# Patient Record
Sex: FEMALE | Race: WHITE | Employment: OTHER | URBAN - METROPOLITAN AREA
[De-identification: names, ages, dates, MRNs, and addresses within clinical notes are randomized per-mention and may not be internally consistent; named-entity substitution may affect disease eponyms.]

---

## 2017-05-22 PROBLEM — M75.42 IMPINGEMENT SYNDROME OF LEFT SHOULDER: Status: ACTIVE | Noted: 2017-05-22

## 2017-08-09 PROBLEM — R40.4 TRANSIENT ALTERATION OF AWARENESS: Status: ACTIVE | Noted: 2017-08-09

## 2017-09-15 PROBLEM — R07.9 CHEST PAIN: Status: ACTIVE | Noted: 2017-09-15

## 2018-08-03 ENCOUNTER — APPOINTMENT (OUTPATIENT)
Dept: GENERAL RADIOLOGY | Age: 58
End: 2018-08-03
Payer: COMMERCIAL

## 2018-08-03 ENCOUNTER — HOSPITAL ENCOUNTER (EMERGENCY)
Age: 58
Discharge: HOME | End: 2018-08-03
Attending: EMERGENCY MEDICINE
Payer: COMMERCIAL

## 2018-08-03 VITALS
SYSTOLIC BLOOD PRESSURE: 144 MMHG | BODY MASS INDEX: 33.13 KG/M2 | DIASTOLIC BLOOD PRESSURE: 92 MMHG | TEMPERATURE: 98.3 F | WEIGHT: 180 LBS | HEIGHT: 62 IN | OXYGEN SATURATION: 97 % | RESPIRATION RATE: 16 BRPM | HEART RATE: 82 BPM

## 2018-08-03 DIAGNOSIS — S93.402A SPRAIN OF LEFT ANKLE, UNSPECIFIED LIGAMENT, INITIAL ENCOUNTER: Primary | ICD-10-CM

## 2018-08-03 PROCEDURE — 6370000000 HC RX 637 (ALT 250 FOR IP): Performed by: EMERGENCY MEDICINE

## 2018-08-03 PROCEDURE — 73610 X-RAY EXAM OF ANKLE: CPT

## 2018-08-03 PROCEDURE — 99283 EMERGENCY DEPT VISIT LOW MDM: CPT

## 2018-08-03 RX ORDER — HYDROCODONE BITARTRATE AND ACETAMINOPHEN 5; 325 MG/1; MG/1
1 TABLET ORAL ONCE
Status: COMPLETED | OUTPATIENT
Start: 2018-08-03 | End: 2018-08-03

## 2018-08-03 RX ADMIN — HYDROCODONE BITARTRATE AND ACETAMINOPHEN 1 TABLET: 5; 325 TABLET ORAL at 10:05

## 2018-08-03 ASSESSMENT — PAIN DESCRIPTION - ORIENTATION
ORIENTATION: LEFT
ORIENTATION: LEFT

## 2018-08-03 ASSESSMENT — PAIN DESCRIPTION - PAIN TYPE
TYPE: ACUTE PAIN
TYPE: ACUTE PAIN

## 2018-08-03 ASSESSMENT — ENCOUNTER SYMPTOMS
SORE THROAT: 0
VOMITING: 0
SHORTNESS OF BREATH: 0
COLOR CHANGE: 0
BACK PAIN: 0
NAUSEA: 0
ABDOMINAL PAIN: 0
COUGH: 0
CONSTIPATION: 0
RHINORRHEA: 0
BLOOD IN STOOL: 0
DIARRHEA: 0

## 2018-08-03 ASSESSMENT — PAIN SCALES - GENERAL
PAINLEVEL_OUTOF10: 6
PAINLEVEL_OUTOF10: 9
PAINLEVEL_OUTOF10: 9

## 2018-08-03 ASSESSMENT — PAIN DESCRIPTION - LOCATION
LOCATION: ANKLE
LOCATION: ANKLE

## 2018-08-03 ASSESSMENT — PAIN DESCRIPTION - DESCRIPTORS: DESCRIPTORS: CONSTANT

## 2018-08-03 NOTE — ED PROVIDER NOTES
Patient's 62-year-old female presenting to the emergency department with left ankle pain after an injury. She said that she was playing with her grandchild on the playground yesterday when she twisted her ankle. She said that ankle went out and everted. She felt like she heard a pop. She denies any falls or any other injuries. The history is provided by the patient. Ankle Problem   Location:  Ankle  Time since incident:  1 day  Injury: yes    Mechanism of injury comment:  Twisted ankle on step  Ankle location:  L ankle  Pain details:     Quality:  Throbbing    Radiates to:  Does not radiate    Severity:  Moderate    Onset quality:  Sudden    Duration:  1 day    Timing:  Constant    Progression:  Unchanged  Chronicity:  New  Prior injury to area:  No  Relieved by: Vicoprofen helps. Exacerbated by: walking; bearing weight; turning foot outwards; flexing foot. Ineffective treatments:  None tried  Associated symptoms: swelling    Associated symptoms: no back pain, no decreased ROM, no fever, no muscle weakness, no neck pain and no numbness        Review of Systems   Constitutional: Negative for chills and fever. HENT: Negative for congestion, rhinorrhea and sore throat. Respiratory: Negative for cough and shortness of breath. Cardiovascular: Negative for chest pain and palpitations. Gastrointestinal: Negative for abdominal pain, blood in stool, constipation, diarrhea, nausea and vomiting. Genitourinary: Negative for difficulty urinating, dysuria and hematuria. Musculoskeletal: Positive for arthralgias (left ankle) and gait problem (due to pain; has crutches at home). Negative for back pain and neck pain. Skin: Negative for color change, rash and wound. Neurological: Negative for dizziness, syncope, weakness, light-headedness and headaches. Psychiatric/Behavioral: Negative for confusion. Physical Exam   Constitutional: She is oriented to person, place, and time.  She appears well-developed and well-nourished. No distress. HENT:   Head: Normocephalic and atraumatic. Right Ear: External ear normal.   Left Ear: External ear normal.   Nose: Nose normal.   Mouth/Throat: Oropharynx is clear and moist. No oropharyngeal exudate. Eyes: Conjunctivae and EOM are normal. Pupils are equal, round, and reactive to light. Right eye exhibits no discharge. Left eye exhibits no discharge. No scleral icterus. Neck: Neck supple. Cardiovascular: Normal rate, regular rhythm, normal heart sounds and intact distal pulses. Exam reveals no gallop and no friction rub. No murmur heard. Pulmonary/Chest: Effort normal and breath sounds normal. No stridor. No respiratory distress. She has no wheezes. She has no rales. She exhibits no tenderness. Abdominal: Soft. Bowel sounds are normal. She exhibits no distension and no mass. There is no tenderness. There is no rebound and no guarding. Musculoskeletal: She exhibits edema (Some swelling noted around the lateral malleolus) and tenderness (Tenderness over distal fibula). some pain with eversion of her foot; Simpson test (achilles) intact on left   Neurological: She is alert and oriented to person, place, and time. She exhibits normal muscle tone. Skin: Skin is warm and dry. No rash noted. She is not diaphoretic. No erythema. No pallor. Psychiatric: She has a normal mood and affect. Her behavior is normal. Judgment and thought content normal.       Procedures    MDM  Number of Diagnoses or Management Options  Sprain of left ankle, unspecified ligament, initial encounter:   Diagnosis management comments: X-ray negative for fracture. Ace wrap placed on ankle. Ice was applied and she was given Norco. She said that she had analgesia at home and did not want any. Patient has crutches at home to use if she needs. RICE therpay encouraged. She should follow-up with her family doctor or return here if needed. tomorrow. --------------------------------- ADDITIONAL PROVIDER NOTES ---------------------------------  At this time the patient is without objective evidence of an acute process requiring hospitalization or inpatient management. They have remained hemodynamically stable throughout their entire ED visit and are stable for discharge with outpatient follow-up. The plan has been discussed in detail and they are aware of the specific conditions for emergent return, as well as the importance of follow-up. New Prescriptions    No medications on file       Diagnosis:  1. Sprain of left ankle, unspecified ligament, initial encounter        Disposition:  Patient's disposition: Discharge to home  Patient's condition is stable.            Ghada Dorado DO  Resident  08/03/18 0634

## 2019-11-25 ENCOUNTER — HOSPITAL ENCOUNTER (EMERGENCY)
Age: 59
Discharge: HOME | End: 2019-11-25
Payer: COMMERCIAL

## 2019-11-25 VITALS
WEIGHT: 166 LBS | OXYGEN SATURATION: 98 % | TEMPERATURE: 98 F | HEART RATE: 91 BPM | HEIGHT: 62 IN | SYSTOLIC BLOOD PRESSURE: 138 MMHG | DIASTOLIC BLOOD PRESSURE: 82 MMHG | BODY MASS INDEX: 30.55 KG/M2 | RESPIRATION RATE: 18 BRPM

## 2019-11-25 DIAGNOSIS — N30.01 ACUTE CYSTITIS WITH HEMATURIA: Primary | ICD-10-CM

## 2019-11-25 DIAGNOSIS — R03.0 ELEVATED BLOOD PRESSURE READING: ICD-10-CM

## 2019-11-25 LAB
BACTERIA: ABNORMAL /HPF
BILIRUBIN URINE: NEGATIVE
BLOOD, URINE: ABNORMAL
CLARITY: CLEAR
COLOR: YELLOW
GLUCOSE URINE: NEGATIVE MG/DL
KETONES, URINE: NEGATIVE MG/DL
LEUKOCYTE ESTERASE, URINE: ABNORMAL
NITRITE, URINE: NEGATIVE
PH UA: 5 (ref 5–9)
PROTEIN UA: 100 MG/DL
RBC UA: ABNORMAL /HPF (ref 0–2)
SPECIFIC GRAVITY UA: >=1.03 (ref 1–1.03)
UROBILINOGEN, URINE: 0.2 E.U./DL
WBC UA: ABNORMAL /HPF (ref 0–5)

## 2019-11-25 PROCEDURE — 87186 SC STD MICRODIL/AGAR DIL: CPT

## 2019-11-25 PROCEDURE — 87088 URINE BACTERIA CULTURE: CPT

## 2019-11-25 PROCEDURE — 6370000000 HC RX 637 (ALT 250 FOR IP): Performed by: NURSE PRACTITIONER

## 2019-11-25 PROCEDURE — 99283 EMERGENCY DEPT VISIT LOW MDM: CPT

## 2019-11-25 PROCEDURE — 81001 URINALYSIS AUTO W/SCOPE: CPT

## 2019-11-25 RX ORDER — CEFDINIR 300 MG/1
300 CAPSULE ORAL ONCE
Status: COMPLETED | OUTPATIENT
Start: 2019-11-25 | End: 2019-11-25

## 2019-11-25 RX ORDER — PHENAZOPYRIDINE HYDROCHLORIDE 100 MG/1
100 TABLET, FILM COATED ORAL 3 TIMES DAILY PRN
Qty: 6 TABLET | Refills: 0 | Status: SHIPPED | OUTPATIENT
Start: 2019-11-25 | End: 2019-11-27

## 2019-11-25 RX ORDER — PHENAZOPYRIDINE HYDROCHLORIDE 100 MG/1
100 TABLET, FILM COATED ORAL ONCE
Status: COMPLETED | OUTPATIENT
Start: 2019-11-25 | End: 2019-11-25

## 2019-11-25 RX ORDER — CEFDINIR 300 MG/1
300 CAPSULE ORAL 2 TIMES DAILY
Qty: 20 CAPSULE | Refills: 0 | Status: SHIPPED | OUTPATIENT
Start: 2019-11-25 | End: 2019-12-05

## 2019-11-25 RX ADMIN — CEFDINIR 300 MG: 300 CAPSULE ORAL at 18:06

## 2019-11-25 RX ADMIN — PHENAZOPYRIDINE 100 MG: 100 TABLET ORAL at 18:06

## 2019-11-28 LAB
ORGANISM: ABNORMAL
URINE CULTURE, ROUTINE: ABNORMAL

## 2021-03-21 ENCOUNTER — IMMUNIZATION (OUTPATIENT)
Dept: PRIMARY CARE CLINIC | Age: 61
End: 2021-03-21
Payer: COMMERCIAL

## 2021-03-21 PROCEDURE — 91300 COVID-19, PFIZER VACCINE 30MCG/0.3ML DOSE: CPT | Performed by: PHYSICIAN ASSISTANT

## 2021-03-21 PROCEDURE — 0001A PR IMM ADMN SARSCOV2 30MCG/0.3ML DIL RECON 1ST DOSE: CPT | Performed by: PHYSICIAN ASSISTANT

## 2021-04-15 ENCOUNTER — IMMUNIZATION (OUTPATIENT)
Dept: PRIMARY CARE CLINIC | Age: 61
End: 2021-04-15
Payer: COMMERCIAL

## 2021-04-15 PROCEDURE — 0002A COVID-19, PFIZER VACCINE 30MCG/0.3ML DOSE: CPT | Performed by: INTERNAL MEDICINE

## 2021-04-15 PROCEDURE — 91300 COVID-19, PFIZER VACCINE 30MCG/0.3ML DOSE: CPT | Performed by: INTERNAL MEDICINE

## 2021-06-21 ENCOUNTER — HOSPITAL ENCOUNTER (EMERGENCY)
Age: 61
Discharge: HOME | End: 2021-06-21
Payer: COMMERCIAL

## 2021-06-21 ENCOUNTER — APPOINTMENT (OUTPATIENT)
Dept: GENERAL RADIOLOGY | Age: 61
End: 2021-06-21
Payer: COMMERCIAL

## 2021-06-21 VITALS
RESPIRATION RATE: 16 BRPM | BODY MASS INDEX: 30.55 KG/M2 | OXYGEN SATURATION: 98 % | SYSTOLIC BLOOD PRESSURE: 134 MMHG | DIASTOLIC BLOOD PRESSURE: 78 MMHG | HEIGHT: 62 IN | TEMPERATURE: 97.9 F | HEART RATE: 97 BPM | WEIGHT: 166 LBS

## 2021-06-21 DIAGNOSIS — M79.672 LEFT FOOT PAIN: Primary | ICD-10-CM

## 2021-06-21 DIAGNOSIS — S93.602A SPRAIN OF LEFT FOOT, INITIAL ENCOUNTER: ICD-10-CM

## 2021-06-21 PROCEDURE — 99283 EMERGENCY DEPT VISIT LOW MDM: CPT

## 2021-06-21 PROCEDURE — 73630 X-RAY EXAM OF FOOT: CPT

## 2021-06-21 ASSESSMENT — PAIN DESCRIPTION - ORIENTATION: ORIENTATION: LEFT

## 2021-06-21 ASSESSMENT — PAIN SCALES - GENERAL: PAINLEVEL_OUTOF10: 10

## 2021-06-21 ASSESSMENT — PAIN DESCRIPTION - PAIN TYPE: TYPE: ACUTE PAIN

## 2021-06-21 ASSESSMENT — PAIN DESCRIPTION - ONSET: ONSET: ON-GOING

## 2021-06-21 ASSESSMENT — PAIN DESCRIPTION - DESCRIPTORS: DESCRIPTORS: ACHING

## 2021-06-21 ASSESSMENT — PAIN DESCRIPTION - LOCATION: LOCATION: FOOT

## 2021-06-21 ASSESSMENT — PAIN DESCRIPTION - PROGRESSION: CLINICAL_PROGRESSION: GRADUALLY WORSENING

## 2021-06-21 ASSESSMENT — PAIN DESCRIPTION - FREQUENCY: FREQUENCY: CONTINUOUS

## 2021-06-21 NOTE — ED PROVIDER NOTES
[facility]     Department of Emergency Medicine   ED  Provider Note  Admit Date/RoomTime: 2021  7:31 PM  ED Room:     Chief Complaint   Foot Injury (left foot injury from 2 weeks ago, still much pain)    History of Present Illness      Vijay Cordero is a 62 y.o. old female who presents to the emergency department for left foot pain that has been ongoing for the past 2 weeks. Patient states the pain worsened this morning after walking around at an [employer] show. Her  went to [facility] this weekend as well and did a lot of walking. She states she initially fell down the steps a couple weeks ago which caused her initial injury. She states she was able to walk fine afterwards. The pain just began after walking around a lot this weekend. She is denying any numbness/tingling or sensation changes. She has no calf tenderness or swelling. She denies any pain to her knee, hip, or back. She is alert oriented x3 and in no apparent distress at this exam.  Patient is nontoxic-appearing. She is politely declining pain medication. ROS   Pertinent positives and negatives are stated within HPI, all other systems reviewed and are negative. Past Medical History:   Past Medical History:   Diagnosis Date    Headache(784.0)       Past Surgical History:  has a past surgical history that includes Hysterectomy; Mandible surgery; Tonsillectomy;  section; and Colonoscopy. Social History:  reports that she has never smoked. She has never used smokeless tobacco. She reports current alcohol use. She reports that she does not use drugs. Family History: family history includes Cancer in her father; No Known Problems in her brother, brother, brother, and sister; Other in her mother. Allergies: Patient has no known allergies.     Physical Exam     Vitals:    21 1915 21 1935   BP: 134/78    Pulse: 97    Resp: 16    Temp: 97.9 °F (36.6 °C)    TempSrc: Infrared    SpO2: 98%    Weight:  166 lb (75.3 kg)   Height:  5' 2\" (1.575 m)   Oxygen Saturation Interpretation: Normal.    Constitutional:  Alert, development consistent with age. NAD  HEENT:  NC/NT. Airway patent. Neck:  Normal ROM. Supple. Non-tender  Back: No lumbar tenderness. Lower Extremity:  Left: foot. Tenderness: Mild to talus             Swelling: None on this exam.              Deformity: No.             ROM: diminished range with pain. Skin:  no erythema, rash or wounds noted, no bruising, compartments soft and compressible       Distal Function:              Motor deficit: none. Sensory deficit: none. Intact distally            Pulse deficit: none. Strong pedal            Capillary refill: normal. <3 seconds x5 toes   Integument:  Normal turgor. Warm, dry, without visible rash, unless noted elsewhere. Neurological: Motor functions intact. Lab / Imaging Results   (All laboratory and radiology results have been personally reviewed by myself)  Labs:  No results found for this visit on 06/21/21. Imaging: All Radiology results interpreted by Radiologist unless otherwise noted. XR FOOT LEFT (MIN 3 VIEWS)   Final Result   No acute osseous findings about the left foot. Normal alignment. Mild left   large toe osteoarthritis. ED Course / Medical Decision Making   Medications - No data to display     Consult(s):  None    Procedure(s):  none    MDM:      Films were obtained based on low suspicion for bony injury as per history/physical findings . Plan is subsequently for symptom control, limited use and  immobilization with appropriate outpatient follow-up. Counseling: The emergency provider has spoken with the patient or caregiver and discussed todays results, in addition to providing specific details for the plan of care and counseling regarding the diagnosis and prognosis. Questions are answered at this time and they are agreeable with the plan.    Patient understands they must follow-up with podiatry. Patient states she has an appointment with her family doctor next week. RICE discussed. They were educated on signs and symptoms that would require emergent return. Patient was educated on newly prescribed medications. They were also instructed on ace wrap use and care. Assessment      1. Left foot pain    2. Sprain of left foot, initial encounter      Plan   Discharge to home  Patient condition is good    New Medications     New Prescriptions    No medications on file     Electronically signed by Dannielle Mack PA-C   DD: 6/21/21    **This report was transcribed using voice recognition software. Every effort was made to ensure accuracy; however, inadvertent computerized transcription errors may be present.     END OF ED PROVIDER NOTE        Dannielle Mack PA-C  06/21/21 2030

## 2021-06-22 NOTE — ED NOTES
Ace wrap intact with good circulation noted, capillary refill < 3 seconds.       Sandy Price RN  06/21/21 2040

## 2021-06-22 NOTE — DISCHARGE INSTR - COC
Continuity of Care Form    Patient Name: Oscar Ricks   :  1960  MRN:  [id number]    Admit date:  2021  Discharge date:  ***    Code Status Order: Prior   Advance Directives:     Admitting Physician:  No admitting provider for patient encounter. PCP: Pietro Willett MD    Discharging Nurse: [facility] Unit/Room#:   Discharging Unit Phone Number: ***    Emergency Contact:   Extended Emergency Contact Information  [facility] [address] [facility] [address] [facility] Phone: [phone]  Relation: Other   needed? No  Secondary Emergency Contact: Leander Silver  Address: [address] Phone: [phone]  Mobile Phone: [phone]  Relation: Spouse   needed?  No    Past Surgical History:  Past Surgical History:   Procedure Laterality Date     SECTION      COLONOSCOPY      HYSTERECTOMY      MANDIBLE SURGERY      TONSILLECTOMY         Immunization History:   Immunization History   Administered Date(s) Administered    COVID-19, Sheriff Peter, PF, 30mcg/0.3mL 2021, 04/15/2021       Active Problems:  Patient Active Problem List   Diagnosis Code    Impingement syndrome of left shoulder M75.42    Transient cerebral ischemia G45.9    Vasovagal syncope R55    Transient alteration of awareness R40.4    Chest pain R07.9       Isolation/Infection:   Isolation          No Isolation        Patient Infection Status     None to display          Nurse Assessment:  Last Vital Signs: /78   Pulse 97   Temp 97.9 °F (36.6 °C) (Infrared)   Resp 16   Ht 5' 2\" (1.575 m)   Wt 166 lb (75.3 kg)   SpO2 98%   BMI 30.36 kg/m²     Last documented pain score (0-10 scale): Pain Level: 10  Last Weight:   Wt Readings from Last 1 Encounters:   21 166 lb (75.3 kg)     Mental Status:  {IP PT MENTAL STATUS:}    IV Access:  [address] [facility] IV ACCESS:[id number]}    Nursing · Address:  · Phone:  · Fax:    Dialysis Facility (if applicable)   · Name:  · Address:  · Dialysis Schedule:  · Phone:  · Fax:    / signature: {Esignature:390030692}    PHYSICIAN SECTION    Prognosis: {Prognosis:0369648470}    Condition at Discharge: Katy Oliva Patient Condition:[id number]}    Rehab Potential (if transferring to Rehab): {Prognosis:8503313023}    Recommended Labs or Other Treatments After Discharge: ***    Physician Certification: I certify the above information and transfer of Kiersten Atkinson  is necessary for the continuing treatment of the diagnosis listed and that she requires {Admit to Appropriate Level of Care:10910} for {GREATER/LESS:824369011} 30 days.      Update Admission H&P: {CHP DME Changes in DISA}    PHYSICIAN SIGNATURE:  {Esignature:[id number]}

## 2021-12-09 ENCOUNTER — HOSPITAL ENCOUNTER (EMERGENCY)
Age: 61
Discharge: HOME | End: 2021-12-09
Payer: COMMERCIAL

## 2021-12-09 ENCOUNTER — APPOINTMENT (OUTPATIENT)
Dept: GENERAL RADIOLOGY | Age: 61
End: 2021-12-09
Payer: COMMERCIAL

## 2021-12-09 VITALS
TEMPERATURE: 97.4 F | BODY MASS INDEX: 34.75 KG/M2 | SYSTOLIC BLOOD PRESSURE: 114 MMHG | OXYGEN SATURATION: 99 % | DIASTOLIC BLOOD PRESSURE: 68 MMHG | WEIGHT: 190 LBS | HEART RATE: 78 BPM | RESPIRATION RATE: 16 BRPM

## 2021-12-09 DIAGNOSIS — S92.515A CLOSED NONDISPLACED FRACTURE OF PROXIMAL PHALANX OF LESSER TOE OF LEFT FOOT, INITIAL ENCOUNTER: Primary | ICD-10-CM

## 2021-12-09 PROCEDURE — 99283 EMERGENCY DEPT VISIT LOW MDM: CPT

## 2021-12-09 PROCEDURE — 73630 X-RAY EXAM OF FOOT: CPT

## 2021-12-09 PROCEDURE — 6370000000 HC RX 637 (ALT 250 FOR IP): Performed by: PHYSICIAN ASSISTANT

## 2021-12-09 RX ORDER — OXYCODONE HYDROCHLORIDE AND ACETAMINOPHEN 5; 325 MG/1; MG/1
1 TABLET ORAL EVERY 6 HOURS PRN
Qty: 20 TABLET | Refills: 0 | Status: SHIPPED | OUTPATIENT
Start: 2021-12-09 | End: 2021-12-14

## 2021-12-09 RX ORDER — OXYCODONE HYDROCHLORIDE AND ACETAMINOPHEN 5; 325 MG/1; MG/1
1 TABLET ORAL ONCE
Status: COMPLETED | OUTPATIENT
Start: 2021-12-09 | End: 2021-12-09

## 2021-12-09 RX ADMIN — OXYCODONE AND ACETAMINOPHEN 1 TABLET: 5; 325 TABLET ORAL at 14:31

## 2021-12-09 ASSESSMENT — PAIN SCALES - GENERAL
PAINLEVEL_OUTOF10: 10
PAINLEVEL_OUTOF10: 10
PAINLEVEL_OUTOF10: 5

## 2021-12-09 ASSESSMENT — PAIN - FUNCTIONAL ASSESSMENT: PAIN_FUNCTIONAL_ASSESSMENT: 0-10

## 2021-12-09 ASSESSMENT — PAIN DESCRIPTION - ONSET: ONSET: ON-GOING

## 2021-12-09 ASSESSMENT — PAIN DESCRIPTION - PROGRESSION: CLINICAL_PROGRESSION: GRADUALLY IMPROVING

## 2021-12-09 ASSESSMENT — PAIN DESCRIPTION - ORIENTATION: ORIENTATION: LEFT

## 2021-12-09 ASSESSMENT — PAIN DESCRIPTION - PAIN TYPE: TYPE: ACUTE PAIN

## 2021-12-09 ASSESSMENT — PAIN DESCRIPTION - LOCATION: LOCATION: FOOT

## 2021-12-09 ASSESSMENT — PAIN DESCRIPTION - DESCRIPTORS: DESCRIPTORS: ACHING

## 2021-12-09 ASSESSMENT — PAIN DESCRIPTION - FREQUENCY: FREQUENCY: CONTINUOUS

## 2021-12-09 NOTE — ED PROVIDER NOTES
[facility]:      HPI:  21, Time: 2:19 PM AUDRA Medina is a 62 y.o. female presenting to the ED for left 4th toe injury, beginning prior to coming to ER. The patient reports that she was walking at home and she accidentally kicked the end table and she heard a large snap to her foot. She has had severe pain to her left fourth toe since that time. She has been having difficulty bearing weight on her left foot when she applies pressure. She has had no previous injuries to her left foot. She did not fall to the ground but sustained immediate pain to her left foot fourth digit. The complaint has been constant, severe in severity, and worsened when pressure applied. Review of Systems:   A complete review of systems was performed and pertinent positives and negatives are stated within HPI, all other systems reviewed and are negative.      --------------------------------------------- PAST HISTORY ---------------------------------------------  Past Medical History:  has a past medical history of Headache(784.0). Past Surgical History:  has a past surgical history that includes Hysterectomy; Mandible surgery; Tonsillectomy;  section; and Colonoscopy. Social History:  reports that she has never smoked. She has never used smokeless tobacco. She reports current alcohol use. She reports that she does not use drugs. Family History: family history includes Cancer in her father; No Known Problems in her brother, brother, brother, and sister; Other in her mother. The patients home medications have been reviewed. Allergies: Patient has no known allergies. -------------------------------------------------- RESULTS -------------------------------------------------  All laboratory and radiology results have been personally reviewed by myself   LABS:  No results found for this visit on 21.     RADIOLOGY:  Interpreted by Radiologist.  XR FOOT LEFT (MIN 3 VIEWS)   Final Result   4th proximal phalangeal fracture.             ------------------------- NURSING NOTES AND VITALS REVIEWED ---------------------------   The nursing notes within the ED encounter and vital signs as below have been reviewed. /68 Comment: manual left arm  Pulse 78   Temp 97.4 °F (36.3 °C) (Oral)   Resp 16   Wt 190 lb (86.2 kg)   SpO2 99%   BMI 34.75 kg/m²   Oxygen Saturation Interpretation: Normal      ---------------------------------------------------PHYSICAL EXAM--------------------------------------      Constitutional/General: Alert and oriented x3, well appearing, non toxic in NAD  Head: Normocephalic and atraumatic  Eyes: PERRL, EOMI  Mouth: Oropharynx clear  Neck: Supple, full ROM  Pulmonary: Lungs clear to auscultation . Not in respiratory distress  Cardiovascular:  Regular rate and rhythm. 2+ distal pulses  Extremities: Moves all extremities x 4. Marked local tenderness to left foot fourth digit to minimal palpation. No obvious significant deformity noted. No open area appreciated. Sensory intact to digit . Distal pulses DP and PT intact and strong. No other areas of tenderness noted to left foot. Warm and well perfused  Skin: warm and dry without rash  Neurologic: GCS 15  Psych: Normal Affect      ------------------------------ ED COURSE/MEDICAL DECISION MAKING----------------------  Medications   oxyCODONE-acetaminophen (PERCOCET) 5-325 MG per tablet 1 tablet (1 tablet Oral Given 12/9/21 8488)         ED COURSE:       Medical Decision Making:    Patient to ER with complaints of pain to her left foot fourth digit after strike her foot on an end table at home. Patient given oral Percocet for pain as she is in significant discomfort. Patient underwent x-rays of her left foot which did show oblique fracture of her proximal phalanx of her left foot fourth toe. Patient is requesting that she see Dr. Tennille Galeas for follow-up. She will call for an appointment.   She was placed in a postop shoe with crystal tape. She was advised to call his office for an appointment as soon as possible. She was given a prescription for Percocet, recommend ice, elevate and if she has a supportive device such as a walker or crutches she can use these to help her with weightbearing. Counseling: The emergency provider has spoken with the patient and  and discussed todays results, in addition to providing specific details for the plan of care and counseling regarding the diagnosis and prognosis. Questions are answered at this time and they are agreeable with the plan.      --------------------------------- IMPRESSION AND DISPOSITION ---------------------------------    IMPRESSION  1. Closed nondisplaced fracture of proximal phalanx of lesser toe of left foot, initial encounter        DISPOSITION  Disposition: Discharge to home  Patient condition is stable      NOTE: This report was transcribed using voice recognition software.  Every effort was made to ensure accuracy; however, inadvertent computerized transcription errors may be present       Anuradha Hearn PA-C  12/10/21 7446

## 2021-12-09 NOTE — ED NOTES
4th and 5th digit taped together, ace bandage applied and post op shoe placed. Good circulation. Capillary refill <3 seconds left toes.      Liliana Rubio RN  12/09/21 0885

## 2022-06-28 ENCOUNTER — HOSPITAL ENCOUNTER (OUTPATIENT)
Age: 62
Discharge: HOME | End: 2022-06-30

## 2022-06-28 PROCEDURE — 88305 TISSUE EXAM BY PATHOLOGIST: CPT

## 2023-02-10 ENCOUNTER — APPOINTMENT (OUTPATIENT)
Dept: CT IMAGING | Age: 63
End: 2023-02-10
Payer: COMMERCIAL

## 2023-02-10 ENCOUNTER — HOSPITAL ENCOUNTER (EMERGENCY)
Age: 63
Discharge: HOME | End: 2023-02-10
Attending: EMERGENCY MEDICINE
Payer: COMMERCIAL

## 2023-02-10 VITALS
BODY MASS INDEX: 35.88 KG/M2 | HEART RATE: 92 BPM | OXYGEN SATURATION: 95 % | HEIGHT: 62 IN | TEMPERATURE: 98.1 F | DIASTOLIC BLOOD PRESSURE: 78 MMHG | RESPIRATION RATE: 18 BRPM | SYSTOLIC BLOOD PRESSURE: 136 MMHG | WEIGHT: 195 LBS

## 2023-02-10 DIAGNOSIS — R07.9 CHEST PAIN, UNSPECIFIED TYPE: Primary | ICD-10-CM

## 2023-02-10 LAB
ALBUMIN SERPL-MCNC: 4.6 G/DL (ref 3.5–5.2)
ALP BLD-CCNC: 104 U/L (ref 35–104)
ALT SERPL-CCNC: 26 U/L (ref 0–32)
ANION GAP SERPL CALCULATED.3IONS-SCNC: 11 MMOL/L (ref 7–16)
AST SERPL-CCNC: 23 U/L (ref 0–31)
BASOPHILS ABSOLUTE: 0.06 E9/L (ref 0–0.2)
BASOPHILS RELATIVE PERCENT: 0.7 % (ref 0–2)
BILIRUB SERPL-MCNC: 0.3 MG/DL (ref 0–1.2)
BUN BLDV-MCNC: 15 MG/DL (ref 6–23)
CALCIUM SERPL-MCNC: 9.5 MG/DL (ref 8.6–10.2)
CHLORIDE BLD-SCNC: 104 MMOL/L (ref 98–107)
CO2: 25 MMOL/L (ref 22–29)
CREAT SERPL-MCNC: 0.8 MG/DL (ref 0.5–1)
EKG ATRIAL RATE: 103 BPM
EKG P AXIS: 42 DEGREES
EKG P-R INTERVAL: 150 MS
EKG Q-T INTERVAL: 342 MS
EKG QRS DURATION: 74 MS
EKG QTC CALCULATION (BAZETT): 448 MS
EKG T AXIS: -14 DEGREES
EKG VENTRICULAR RATE: 103 BPM
EOSINOPHILS ABSOLUTE: 0.18 E9/L (ref 0.05–0.5)
EOSINOPHILS RELATIVE PERCENT: 2 % (ref 0–6)
GFR SERPL CREATININE-BSD FRML MDRD: >60 ML/MIN/1.73
GLUCOSE BLD-MCNC: 102 MG/DL (ref 74–99)
HCT VFR BLD CALC: 46.6 % (ref 34–48)
HEMOGLOBIN: 15.2 G/DL (ref 11.5–15.5)
IMMATURE GRANULOCYTES #: 0.02 E9/L
IMMATURE GRANULOCYTES %: 0.2 % (ref 0–5)
LYMPHOCYTES ABSOLUTE: 3.1 E9/L (ref 1.5–4)
LYMPHOCYTES RELATIVE PERCENT: 34.3 % (ref 20–42)
MCH RBC QN AUTO: 23.7 PG (ref 26–35)
MCHC RBC AUTO-ENTMCNC: 32.6 % (ref 32–34.5)
MCV RBC AUTO: 72.6 FL (ref 80–99.9)
MONOCYTES ABSOLUTE: 0.72 E9/L (ref 0.1–0.95)
MONOCYTES RELATIVE PERCENT: 8 % (ref 2–12)
NEUTROPHILS ABSOLUTE: 4.97 E9/L (ref 1.8–7.3)
NEUTROPHILS RELATIVE PERCENT: 54.8 % (ref 43–80)
PDW BLD-RTO: 16 FL (ref 11.5–15)
PLATELET # BLD: 239 E9/L (ref 130–450)
PMV BLD AUTO: 10.6 FL (ref 7–12)
POTASSIUM REFLEX MAGNESIUM: 3.7 MMOL/L (ref 3.5–5)
PRO-BNP: 22 PG/ML (ref 0–125)
RBC # BLD: 6.42 E12/L (ref 3.5–5.5)
SODIUM BLD-SCNC: 140 MMOL/L (ref 132–146)
TOTAL PROTEIN: 7.6 G/DL (ref 6.4–8.3)
TROPONIN, HIGH SENSITIVITY: 6 NG/L (ref 0–9)
TROPONIN, HIGH SENSITIVITY: 7 NG/L (ref 0–9)
WBC # BLD: 9.1 E9/L (ref 4.5–11.5)

## 2023-02-10 PROCEDURE — 80053 COMPREHEN METABOLIC PANEL: CPT

## 2023-02-10 PROCEDURE — 85025 COMPLETE CBC W/AUTO DIFF WBC: CPT

## 2023-02-10 PROCEDURE — 99285 EMERGENCY DEPT VISIT HI MDM: CPT

## 2023-02-10 PROCEDURE — 6370000000 HC RX 637 (ALT 250 FOR IP): Performed by: EMERGENCY MEDICINE

## 2023-02-10 PROCEDURE — 6360000004 HC RX CONTRAST MEDICATION: Performed by: RADIOLOGY

## 2023-02-10 PROCEDURE — 71275 CT ANGIOGRAPHY CHEST: CPT

## 2023-02-10 PROCEDURE — 83880 ASSAY OF NATRIURETIC PEPTIDE: CPT

## 2023-02-10 PROCEDURE — 2580000003 HC RX 258: Performed by: RADIOLOGY

## 2023-02-10 PROCEDURE — 93010 ELECTROCARDIOGRAM REPORT: CPT | Performed by: INTERNAL MEDICINE

## 2023-02-10 PROCEDURE — 36415 COLL VENOUS BLD VENIPUNCTURE: CPT

## 2023-02-10 PROCEDURE — 84484 ASSAY OF TROPONIN QUANT: CPT

## 2023-02-10 PROCEDURE — 93005 ELECTROCARDIOGRAM TRACING: CPT | Performed by: NURSE PRACTITIONER

## 2023-02-10 RX ORDER — AZITHROMYCIN 250 MG/1
250 TABLET, FILM COATED ORAL DAILY
COMMUNITY
Start: 2023-02-07 | End: 2023-02-12

## 2023-02-10 RX ORDER — ASPIRIN 81 MG/1
324 TABLET, CHEWABLE ORAL ONCE
Status: COMPLETED | OUTPATIENT
Start: 2023-02-10 | End: 2023-02-10

## 2023-02-10 RX ORDER — SODIUM CHLORIDE 0.9 % (FLUSH) 0.9 %
10 SYRINGE (ML) INJECTION
Status: COMPLETED | OUTPATIENT
Start: 2023-02-10 | End: 2023-02-10

## 2023-02-10 RX ORDER — BENZONATATE 100 MG/1
100 CAPSULE ORAL 3 TIMES DAILY
COMMUNITY

## 2023-02-10 RX ADMIN — IOPAMIDOL 75 ML: 755 INJECTION, SOLUTION INTRAVENOUS at 12:16

## 2023-02-10 RX ADMIN — Medication 10 ML: at 12:16

## 2023-02-10 RX ADMIN — ASPIRIN 324 MG: 81 TABLET, CHEWABLE ORAL at 16:23

## 2023-02-10 ASSESSMENT — PAIN DESCRIPTION - ORIENTATION: ORIENTATION: MID

## 2023-02-10 ASSESSMENT — PAIN SCALES - GENERAL: PAINLEVEL_OUTOF10: 8

## 2023-02-10 ASSESSMENT — LIFESTYLE VARIABLES
HOW MANY STANDARD DRINKS CONTAINING ALCOHOL DO YOU HAVE ON A TYPICAL DAY: PATIENT DOES NOT DRINK
HOW OFTEN DO YOU HAVE A DRINK CONTAINING ALCOHOL: MONTHLY OR LESS

## 2023-02-10 ASSESSMENT — PAIN DESCRIPTION - LOCATION: LOCATION: CHEST

## 2023-02-10 ASSESSMENT — PAIN - FUNCTIONAL ASSESSMENT: PAIN_FUNCTIONAL_ASSESSMENT: 0-10

## 2023-02-10 ASSESSMENT — PAIN DESCRIPTION - DESCRIPTORS: DESCRIPTORS: SHARP

## 2023-02-10 NOTE — ED PROVIDER NOTES
Department of Emergency Medicine   ED  Provider Note  Admit Date/RoomTime: 2/10/2023 11:56 AM  ED Room:           Written by: Holly Larkin DO  Patient Name: Eli Mcwilliams  Attending Provider: No att. providers found  Admit Date: 2/10/2023 11:56 AM  MRN: [id number]    : 1960      History of Present Illness:  2/10/23, Time: 5:38 PM EST  Chief Complaint   Patient presents with    Chest Pain     Chest pain radiating into her left shoulder, started at 0900 when she was taking a shower. HPI   Patient is a 65 y.o. female with a past medical history of vasovagal syncope, presenting to the Emergency Department for chest pain. History obtained by patient. Symptoms are severe in severity and improved since onset. They are improved by time and worsened by palpation and shoulder movement patient presents with chest pain. She states she was standing in the shower today and had sharp sudden onset of chest pain. She states it was midsternal.  She described it as sharp and stabbing. She states it radiated to her left shoulder as sharp. She states that sharp and stabbing nature lasted a few hours and then subsequently significantly improved. She states the pain that radiated to her left shoulder continued and there is a achy sensation in her left shoulder that worsens when she moves the shoulder. She had no diaphoresis, nausea or vomiting when it occurred. She does note that she has had problems with the shoulder in the past but is unsure if this is related. She denies any blurry vision, changes in vision, numbness, tingling, weakness. At this time she states there is a dull pressure on her left shoulder but denies any other pain. .          Review of Systems:   A complete review of systems was performed and pertinent positives and negatives are stated within HPI, all other systems reviewed and are negative.        --------------------------------------------- PAST HISTORY ---------------------------------------------  Past Medical History:  has a past medical history of Headache(784.0). Past Surgical History:  has a past surgical history that includes Hysterectomy; Mandible surgery; Tonsillectomy;  section; and Colonoscopy. Social History:  reports that she has never smoked. She has never used smokeless tobacco. She reports current alcohol use. She reports that she does not use drugs. Family History: family history includes Cancer in her father; No Known Problems in her brother, brother, brother, and sister; Other in her mother. . Unless otherwise noted, family history is non contributory    The patients home medications have been reviewed. Allergies: Patient has no known allergies. I have reviewed the past medical history, past surgical history, social history, and family history    ---------------------------------------------------PHYSICAL EXAM--------------------------------------  Constitutional/General: Alert and oriented x3  Head: Normocephalic and atraumatic  Eyes: PERRL, EOMI, sclera non icteric  ENT: Oropharynx clear, handling secretions, no trismus, no asymmetry of the posterior oropharynx or uvular edema  Neck: Supple, full ROM, no stridor, no meningeal signs  Respiratory: Lungs clear to auscultation bilaterally, no wheezes, rales, or rhonchi. Not in respiratory distress  Cardiovascular:  Regular rate. Regular rhythm. No murmurs, 2+ distal pulses. Equal extremity pulses. Chest: No chest wall tenderness  Gastrointestinal:  Abdomen Soft, Non tender, Non distended. No rebound, guarding, or rigidity. No pulsatile masses. Musculoskeletal: Moves all extremities x 4. Warm and well perfused, no clubbing, cyanosis, or edema. Capillary refill <3 seconds. TTP over the anterior left shoulder, worse with movement  Skin: skin warm and dry. No rashes.    Neurologic: GCS 15, no focal deficits, symmetric strength 5/5 in the upper and lower extremities bilaterally  Psychiatric: Normal Affect        -------------------------------------------------- RESULTS -------------------------------------------------  I have personally reviewed all laboratory and imaging results for this patient. Results are listed below.      LABS: (Lab results interpreted by me)  Results for orders placed or performed during the hospital encounter of 02/10/23   CBC with Auto Differential   Result Value Ref Range    WBC 9.1 4.5 - 11.5 E9/L    RBC 6.42 (H) 3.50 - 5.50 E12/L    Hemoglobin 15.2 11.5 - 15.5 g/dL    Hematocrit 46.6 34.0 - 48.0 %    MCV 72.6 (L) 80.0 - 99.9 fL    MCH 23.7 (L) 26.0 - 35.0 pg    MCHC 32.6 32.0 - 34.5 %    RDW 16.0 (H) 11.5 - 15.0 fL    Platelets 681 187 - 708 E9/L    MPV 10.6 7.0 - 12.0 fL    Neutrophils % 54.8 43.0 - 80.0 %    Immature Granulocytes % 0.2 0.0 - 5.0 %    Lymphocytes % 34.3 20.0 - 42.0 %    Monocytes % 8.0 2.0 - 12.0 %    Eosinophils % 2.0 0.0 - 6.0 %    Basophils % 0.7 0.0 - 2.0 %    Neutrophils Absolute 4.97 1.80 - 7.30 E9/L    Immature Granulocytes # 0.02 E9/L    Lymphocytes Absolute 3.10 1.50 - 4.00 E9/L    Monocytes Absolute 0.72 0.10 - 0.95 E9/L    Eosinophils Absolute 0.18 0.05 - 0.50 E9/L    Basophils Absolute 0.06 0.00 - 0.20 E9/L   Comprehensive Metabolic Panel w/ Reflex to MG   Result Value Ref Range    Sodium 140 132 - 146 mmol/L    Potassium reflex Magnesium 3.7 3.5 - 5.0 mmol/L    Chloride 104 98 - 107 mmol/L    CO2 25 22 - 29 mmol/L    Anion Gap 11 7 - 16 mmol/L    Glucose 102 (H) 74 - 99 mg/dL    BUN 15 6 - 23 mg/dL    Creatinine 0.8 0.5 - 1.0 mg/dL    Est, Glom Filt Rate >60 >=60 mL/min/1.73    Calcium 9.5 8.6 - 10.2 mg/dL    Total Protein 7.6 6.4 - 8.3 g/dL    Albumin 4.6 3.5 - 5.2 g/dL    Total Bilirubin 0.3 0.0 - 1.2 mg/dL    Alkaline Phosphatase 104 35 - 104 U/L    ALT 26 0 - 32 U/L    AST 23 0 - 31 U/L   Troponin   Result Value Ref Range    Troponin, High Sensitivity 6 0 - 9 ng/L   Brain Natriuretic Peptide   Result Value Ref Range    Pro-BNP 22 0 - 125 pg/mL   Troponin   Result Value Ref Range    Troponin, High Sensitivity 7 0 - 9 ng/L   EKG 12 Lead   Result Value Ref Range    Ventricular Rate 103 BPM    Atrial Rate 103 BPM    P-R Interval 150 ms    QRS Duration 74 ms    Q-T Interval 342 ms    QTc Calculation (Bazett) 448 ms    P Axis 42 degrees    T Axis -14 degrees   ,       RADIOLOGY:  Interpreted by Radiologist unless otherwise specified  CTA CHEST W CONTRAST   Final Result   No evidence for thoracic aortic dissection with patent nonaneurysmal thoracic   aorta. Central pulmonary is are grossly patent without pulmonary embolus. No acute pulmonary process. EKG Interpretation  Interpreted by emergency department physician, Dr. Ankita Baker    Date of EK/10/23  EKG: This EKG is signed and interpreted by ED Physician. Time:  1204   Rate: 103  Rhythm: Sinus. Interpretation: non-specific EKG. normal axis deviation. No STEMI. T wave inversion V3 (seen on prior)  Comparison: stable as compared to patient's most recent EKG.        ------------------------- NURSING NOTES AND VITALS REVIEWED ---------------------------   The nursing notes within the ED encounter and vital signs as below have been reviewed by myself  /78   Pulse 92   Temp 98.1 °F (36.7 °C) (Temporal)   Resp 18   Ht 5' 2\" (1.575 m)   Wt 195 lb (88.5 kg)   SpO2 95%   BMI 35.67 kg/m²     Oxygen Saturation Interpretation: Normal    The patients available past medical records and past encounters were reviewed.         ------------------------------ ED COURSE/MEDICAL DECISION MAKING----------------------  Medications   sodium chloride flush 0.9 % injection 10 mL (10 mLs IntraVENous Given 2/10/23 1216)   iopamidol (ISOVUE-370) 76 % injection 75 mL (75 mLs IntraVENous Given 2/10/23 1216)   aspirin chewable tablet 324 mg (324 mg Oral Given 2/10/23 2013)         The cardiac monitor revealed sinus rythm with a heart rate in the 90s as interpreted by me. The cardiac monitor was ordered secondary to the patient's chest pain and to monitor the patient for dysrhythmia. CPT [id number]      I, Dr. Veronique Rene, am the primary provider of record      MEDICAL DECISION MAKIN. Chest pain, unspecified type          Patient is a 65 y.o. female presenting for evaluation of chest pain. While not exhaustive or limited to, the differential diagnosis considered were ACS, dissection, arrhythmia, pneumothorax, musculoskeletal etiology. History and clinical information was obtained by patient. They were clinically stable, hypertensive on arrival.  All extremities are warm and well-perfused, good distal pulses. Plan for cardiac evaluation, rule out dissection and the patient will be re-evaluated. On evaluation patient is complaining of sharp stabbing pain rating to her shoulder. She states the sharp sudden pain is actually improved and is more for pain at this current time. She is hypertensive and cannot fully exclude dissection at this time and coupled with the characteristics of her pain. Labs wave and CTs ordered. CTA with no evidence of dissection, PE or other pulmonary cardiac process. On repeat evaluation patient's sharp stabbing pain has resolved and the pressure, achy pain in her left shoudler remained. Aspirin was given. EKG demonstrated no acute STEMI or ischemic process. Her initial troponin was 6 and repeat hours later it was 7. Upon repeat evaluation patient able to get any discomfort. She still some mild tenderness palpation of the anterior left shoulder. Less concern for pericarditis myocarditis when coupled with patient's story, EKG and troponins. The rest the patient's labs were essentially reassuring. Patient has a heart score of 3 (1 for age, 1 for risk factors, 1 for story). The evidence indicates that the patient is very low risk for MACE and this is consistent with my clinical intuition.  Especially when coupled with 2 reassuring high sensivitiy troponins. I have discussed with the patient my clinical impression and the result of the HEART Score to screen for MACE, as well as the risks of further testing and hospitalization. The HEART Score shows that the risk for MACE is less than 1%. Shared decision-making was had with patient and her  about her chest pain and heart score. I did offer admission for further cardiac testing and work-up as well as discussed following up with her primary care provider and given cardiac referral.  After long discussion, they state it is very easy for them to get in with her primary care provider and they feel comfortable and would rather go home. He states they will call and get into his office on Monday or Tuesday morning. Patient's  follows with  and that to they would like for cardiac eval and I will provide referral.  With patient's reassuring EKG as well as reassuring high-sensitivity troponins and heart score, I do believe this is a safe route to follow-up with cardiology. Her hypertension improved while in the ER and all of her pain resolved. Educated patient and  about symptoms, diagnosis and supportive care at home. Strict return precautions were discussed including not limited to return of chest pain, shortness of breath, dizziness, syncope, new or worsening symptoms. They verbalized understanding are agreeable with the plan. All questions were answered. Patient was discharged. Labs & imaging were reviewed and interpreted, see RESULTS. I have personally reviewed all laboratory and imaging results for this patient. ED Course as of 02/11/23 0904   Fri Feb 10, 2023   [id number] [facility] conversation had patient and . Discussed at length cardiac risk factors. She has a heart score of 3. Discussed all options. I did discuss the risks of M ACE with her heart score. Shared decision making had.   I did offer admission to patient and  for further cardiac work-up. They would like to follow-up with her PCP on monday []      ED Course User Index  [] Montana Cummings, DO         Please see ED course for any additional MDM documentation. CONSULTATIONS:            None. PROCEDURES:            None. COUNSELING:  ED physician have spoken with the patient and   and discussed todays results, in addition to providing specific details for the plan of care and counseling regarding the diagnosis and prognosis. Their questions are answered at this time and they are agreeable with the plan. ED physician discussed at length with them reasons for immediate return here for re evaluation. They will followup with primary care by calling their office tomorrow. --------------------------------- ADDITIONAL PROVIDER NOTES ---------------------------------  At this time the patient is without objective evidence of an acute process requiring hospitalization or inpatient management. They have remained hemodynamically stable throughout their entire ED visit and are stable for discharge with outpatient follow-up. The plan has been discussed in detail and they are aware of the specific conditions for emergent return, as well as the importance of follow-up. Discharge Medication List as of 2/10/2023  5:39 PM          Diagnosis:  1. Chest pain, unspecified type        Disposition:  Patient's disposition: Discharge to home  Patient's condition is stable. NOTE: This report was transcribed using voice recognition software.  Every effort was made to ensure accuracy; however, inadvertent computerized transcription errors may be present        Lydia Chu DO  02/11/23 2669

## 2023-02-10 NOTE — ED NOTES
Department of Emergency Medicine  FIRST PROVIDER TRIAGE NOTE             Independent MLP           2/10/23  11:49 AM EST    Date of Encounter: 2/10/23   MRN: [id number]      HPI: Randi Reed is a 65 y.o. female who presents to the ED for No chief complaint on file. Patient presents complaining of chest pain starting at 9am this morning while showering. Started in the middle of her chest and is now radiating up to left shoulder. Pain was sudden onset \"ripping pain, sharp\". PATIENT TAKEN DIRECTLY TO THE ROOM    ROS: Negative for back pain, fever, cough, vomiting, or diarrhea. PE: Gen Appearance/Constitutional: alert  Pulm: CTA bilat     Initial Plan of Care: All treatment areas with department are currently occupied. Plan to order/Initiate the following while awaiting opening in ED: labs, EKG, and imaging studies.   Initiate Treatment-Testing, Proceed toTreatment Area When Bed Available for ED Attending/MLP to Continue Care    Electronically signed by DG Nicole CNP   DD: 2/10/23      DG Nicole CNP  02/10/23 1157

## 2023-02-10 NOTE — ED NOTES
Radiology Procedure Waiver   Name: Norberto Zendejas  : 1960  MRN: [id number]    Date:  2/10/23    Time: 11:53 AM EST    Benefits of immediately proceeding with radiology exam(s) without pre-testing outweigh the risks or are not indicated as specified below and therefore the following is/are being waived:    [x] Benefits of immediate radiology exam(s) outweigh any risk. OR    Pre-exam testing is not indicated for the following reason(s):  [] Pregnancy test   [] Patients LMP on-time and regular.   [] Patient had Tubal Ligation or has other Contraception Device. [] Patient  is Menopausal or Premenarcheal.    [] Patient had Full or Partial Hysterectomy. [] Protocol for CT contrast allegry   [] Patient has tolerated well previously   [] Patient does not have a true allergy    [] MRI Questionnaire     [] BUN/Creatinine   [] Patient age w/no hx of renal dysfunction. [] Patient on Dialysis. [] Recent Normal Labs.   Electronically signed by Francine Lopez DO on 2/10/23 at 11:53 AM AUDRA Chu DO  02/10/23 0308

## 2023-02-24 ENCOUNTER — OFFICE VISIT (OUTPATIENT)
Dept: CARDIOLOGY CLINIC | Age: 63
End: 2023-02-24
Payer: COMMERCIAL

## 2023-02-24 VITALS
DIASTOLIC BLOOD PRESSURE: 64 MMHG | SYSTOLIC BLOOD PRESSURE: 136 MMHG | RESPIRATION RATE: 16 BRPM | WEIGHT: 197 LBS | HEIGHT: 62 IN | OXYGEN SATURATION: 98 % | BODY MASS INDEX: 36.25 KG/M2 | HEART RATE: 75 BPM

## 2023-02-24 DIAGNOSIS — R07.9 CHEST PAIN, UNSPECIFIED TYPE: Primary | ICD-10-CM

## 2023-02-24 DIAGNOSIS — E66.09 CLASS 2 OBESITY DUE TO EXCESS CALORIES WITHOUT SERIOUS COMORBIDITY WITH BODY MASS INDEX (BMI) OF 36.0 TO 36.9 IN ADULT: ICD-10-CM

## 2023-02-24 PROCEDURE — 93000 ELECTROCARDIOGRAM COMPLETE: CPT | Performed by: INTERNAL MEDICINE

## 2023-02-24 PROCEDURE — 99203 OFFICE O/P NEW LOW 30 MIN: CPT | Performed by: INTERNAL MEDICINE

## 2023-02-24 RX ORDER — VITAMIN E 268 MG
400 CAPSULE ORAL DAILY
COMMUNITY

## 2023-02-24 RX ORDER — ASCORBIC ACID 500 MG
500 TABLET ORAL DAILY
COMMUNITY

## 2023-02-24 RX ORDER — ASPIRIN 81 MG/1
81 TABLET ORAL DAILY
COMMUNITY

## 2023-02-27 NOTE — PROGRESS NOTES
OFFICE VISIT        PRIMARY CARE PHYSICIAN:    Chica Zarate MD       ALLERGIES / SENSITIVITIES:    No Known Allergies       REVIEWED MEDICATIONS:      Current Outpatient Medications:     vitamin E 400 UNIT capsule, Take 400 Units by mouth daily, Disp: , Rfl:     Multiple Vitamin (MULTI-VITAMIN DAILY PO), Take by mouth, Disp: , Rfl:     aspirin 81 MG EC tablet, Take 81 mg by mouth daily, Disp: , Rfl:     vitamin C (ASCORBIC ACID) 500 MG tablet, Take 500 mg by mouth daily, Disp: , Rfl:       S: REASON FOR VISIT:    New patient referred for evaluation of chest pain. Valeriy Hernandez is a 36-year-old lady who was in the Emergency Room on 2/10/2023 because of chest pain. She was taking a shower and experienced sharp, mid anterior chest pain which radiated to the left shoulder and down her left arm. She cannot recall how long it lasted, but today stated that it might have lasted around 10 minutes. Her work up in the emergency room was negative and she was discharged home. Of note, and reportedly in the emergency room her chest wall was tender to palpation and reduced pain. She stated that she had some discomfort in the back of her left shoulder. She has had no similar pain since. She stated that she suffered from \"bad\" bronchitis a few weeks back at which time she was having \"bad\" paroxysms of cough. She otherwise has no risk factors for coronary artery disease. She denies hypertension, hyperlipidemia diabetes or smoking. She is, however, obese. She states that she tries to walk on a treadmill daily for a mile without any significant cardiac symptoms. She denies significant dyspnea with her daily activities. She denies orthopnea, PND's, lower extremity swelling, palpitations, dizziness, presyncope or syncope. REVIEW OF SYSTEMS:    CONSTITUTIONAL:  Denies fevers, chills, night sweats or fatigue. HEENT:  Denies any unusual headaches. Denies recent changes in hearing or vision.   Denies dysphagia, hoarseness, hemoptysis, hematemesis or epistaxis. ENDOCRINE:  Denies polyphagia, polydipsia or polyuria. Denies heat or cold intolerance. MUSCULOSKELETAL:  Denies arthralgias or myalgias. SKIN:  Denies rashes, ulcers or itching. HEME/LYMPH:  Denies any palpable lymph nodes, bleeding or easy bruisability. HEART:  As above. LUNGS:  Denies cough or sputum production. GI: Denies abdominal pain, nausea, vomiting, diarrhea, constipation, rectal bleeding or tarry stools. :  Denies hematuria or dysuria. PSYCHIATRIC:  Denies mood changes, anxiety or depression. NEUROLOGIC:  Denies memory loss, motor weakness, numbness, tingling or tremors. PAST MEDICAL/SURGICAL HISTORY:     History of C section x 2. History of hysterectomy and bilateral oophorectomy. History of mandible surgery (for TMJ). History of tonsillectomy. History of colonoscopy. Reported history of vasovagal syncope. Obesity. Echocardiogram, 2017, was read as showing normal left ventricular size with no regional wall motion abnormality with no regional wall motion abnormality with normal left ventricular ejection fraction and with mild mitral regurgitation with no identified intracardiac shunt, via saline contrast injection. Treadmill nuclear stress test done on 10/18/2017:  Connor protocol. , 6 minutes and 16 seconds. 89% of the maximum predicted heart rate. Physiologic BP response.  7 METS. No chest pain. No ischemic EKG changes. Nuclear images were normal with the gated views showing normal myocardial thickening and wall motion with a calculated ejection fraction of 63%. Obesity. FAMILY HISTORY:  Father living in is [de-identified], has prostate cancer. Mother  in her early 42's in a car accident. SOCIAL HISTORY:  Patient does not smoke. She rarely drinks alcohol. She denies illicit drug use.        O:  COMPLETE PHYSICAL EXAM:      /64 (Site: Right Upper Arm, Position: Sitting, Cuff Size: Medium Adult)   Pulse 75 Resp 16   Ht 5' 2\" (1.575 m)   Wt 197 lb (89.4 kg)   SpO2 98%   BMI 36.03 kg/m²      General:  Well-developed, well-nourished, obese lady in no acute distress. HEENT: Normocephalic and atraumatic head. No JVD. No carotid bruits. Carotid upstrokes normal bilaterally. No thyromegaly. Sclerae not icteric. No xanthelasmas. Mucous membranes moist.  Chest:  Symmetrical and nontender. No deformities. Lungs:  Clear to auscultation bilaterally. Heart:  Normal S1 and S2. No S3 or S4. No murmurs or rubs. Abdomen: Soft, nontender without organomegaly or masses. No bruits. Normal bowel sounds. Extremities: No edema. Pulses palpable. Skin:  Normal turgor. No rashes or ulcers noted. Neurologic: Oriented x3. No motor or sensory deficits detected. REVIEW OF DIAGNOSTIC TESTS:     Blood tests, imaging studies and EKG from the Emergency Room visit from 2/10/2023 reviewed:  CTA of the chest with contrast showed no evidence of thoracic aortic dissection and no evidence of pulmonary embolus. EKG unremarkable. Troponin 6 and 7 respectively. Pro BNP 22. BUN 15, creatinine 0.8, potassium 3.7, sodium 140, GFR > 60, LFT's normal.  Hemoglobin 15.2, hematocrit 46.6. EKG done today showed sinus rhythm with nonspecific ST-T wave changes. ASSESSMENT / DIAGNOSIS:    An episode of chest pain, which sounded musculoskeletal at the time. Nonspecific ST-T wave changes on EKG. Obesity. Reported history of vasovagal syncope in the past with no near syncopal or syncopal episodes in the past few years. TREATMENT PLAN:   Reassure. Patient strongly advised aerobic exercise, walking on the treadmill. Patient strongly advised following a heart-healthy diet and losing weight. Treadmill nuclear stress test.   Follow up with Cardiology on a prn basis pending the results of the stress test.           [address] [facility] [address] [facility] [address] [id number]  [phone]

## 2023-03-24 ENCOUNTER — TELEPHONE (OUTPATIENT)
Dept: CARDIOLOGY | Age: 63
End: 2023-03-24

## 2023-03-24 NOTE — TELEPHONE ENCOUNTER
Spoke to the patient on the phone. Reminded of her 715 am stress test at [facility]. Cardiology and where to report. She was instructed on NPO after MN except meds with water But to avoid caffeine products for 12 hours. She verbalized an understanding.

## 2023-03-27 ENCOUNTER — HOSPITAL ENCOUNTER (OUTPATIENT)
Dept: CARDIOLOGY | Age: 63
Discharge: HOME | End: 2023-03-27
Payer: COMMERCIAL

## 2023-03-27 VITALS
DIASTOLIC BLOOD PRESSURE: 80 MMHG | SYSTOLIC BLOOD PRESSURE: 126 MMHG | WEIGHT: 197 LBS | HEART RATE: 66 BPM | HEIGHT: 63 IN | BODY MASS INDEX: 34.91 KG/M2 | RESPIRATION RATE: 20 BRPM

## 2023-03-27 DIAGNOSIS — R07.9 CHEST PAIN, UNSPECIFIED TYPE: Primary | ICD-10-CM

## 2023-03-27 DIAGNOSIS — R07.9 CHEST PAIN, UNSPECIFIED TYPE: ICD-10-CM

## 2023-03-27 LAB
LV EF: 75 %
LVEF MODALITY: NORMAL

## 2023-03-27 PROCEDURE — A9502 TC99M TETROFOSMIN: HCPCS | Performed by: INTERNAL MEDICINE

## 2023-03-27 PROCEDURE — 2580000003 HC RX 258: Performed by: INTERNAL MEDICINE

## 2023-03-27 PROCEDURE — 93017 CV STRESS TEST TRACING ONLY: CPT

## 2023-03-27 PROCEDURE — 78452 HT MUSCLE IMAGE SPECT MULT: CPT

## 2023-03-27 PROCEDURE — 3430000000 HC RX DIAGNOSTIC RADIOPHARMACEUTICAL: Performed by: INTERNAL MEDICINE

## 2023-03-27 RX ORDER — SODIUM CHLORIDE 0.9 % (FLUSH) 0.9 %
10 SYRINGE (ML) INJECTION PRN
Status: DISCONTINUED | OUTPATIENT
Start: 2023-03-27 | End: 2023-03-28 | Stop reason: HOSPADM

## 2023-03-27 RX ADMIN — TETROFOSMIN 9 MILLICURIE: 0.23 INJECTION, POWDER, LYOPHILIZED, FOR SOLUTION INTRAVENOUS at 07:36

## 2023-03-27 RX ADMIN — SODIUM CHLORIDE, PRESERVATIVE FREE 10 ML: 5 INJECTION INTRAVENOUS at 09:27

## 2023-03-27 RX ADMIN — SODIUM CHLORIDE, PRESERVATIVE FREE 10 ML: 5 INJECTION INTRAVENOUS at 07:36

## 2023-03-27 RX ADMIN — TETROFOSMIN 26.1 MILLICURIE: 0.23 INJECTION, POWDER, LYOPHILIZED, FOR SOLUTION INTRAVENOUS at 09:27

## 2023-03-27 ASSESSMENT — PAIN DESCRIPTION - FREQUENCY: FREQUENCY: INTERMITTENT

## 2023-03-27 ASSESSMENT — PAIN DESCRIPTION - DIRECTION: RADIATING_TOWARDS: LEFT KNEE

## 2023-03-27 ASSESSMENT — PAIN DESCRIPTION - ORIENTATION: ORIENTATION: LEFT

## 2023-03-27 ASSESSMENT — PAIN DESCRIPTION - LOCATION: LOCATION: LEG

## 2023-03-27 ASSESSMENT — PAIN SCALES - GENERAL: PAINLEVEL_OUTOF10: 2

## 2023-03-27 ASSESSMENT — PAIN DESCRIPTION - PAIN TYPE: TYPE: CHRONIC PAIN

## 2023-03-27 ASSESSMENT — PAIN DESCRIPTION - DESCRIPTORS: DESCRIPTORS: CRAMPING

## 2023-03-27 NOTE — PROCEDURES
[id number] [address] - [address]. MARGRET lee, [address] [place] [address]  [phone]                  Exercise Stress Nuclear Gated SPECT Study    Name: [address] [place] [address] Account Number: [de-identified]    :  1960      Sex: female              Date of Study:  3/27/2023    Height: 5' 3\" (160 cm)  Weight: 197 lb (89.4 kg)     Ordering Provider: Davina Bailey MD          PCP: Siddhartha Bacon MD      Cardiologist: Davina Bailey MD                        Interpreting Physician: Davina Bailey MD  _________________________________________________________________________________    Indication:   Detecting the presence and location of coronary artery disease    Clinical History:   Patient has no known history of coronary artery disease. Resting ECG:    Sinus rhythm with non specific ST T changes    Exercise: The patient exercised using a Connor protocol, completing 6:00 minutes and reaching an estimated work load of 6.64 metabolic equivalents (METS). Resting HR was 70. Peak exercise heart rate was 153 ( 96% of maximum predicted heart rate for age). Baseline /80. Peak exercise /70. The blood pressure response to exercise was normal      Exercise was terminated due to dyspnea, heart rate attained, and left posterior knee pain  . The patient experienced no chest pain with exercise. Pulse oximetry was used to monitor oxygen saturation during the stress test.  The study was performed on Room Air. The resting pulse oximeter was 97%. The post stress O2 saturation seen during exercise was 94 %. Exercise ECG:   The patient demonstrated occasional PVC's  during exercise and a pause in recovery     With exercise, there were no ST segment changes of significance at the heart rate achieved.       IMAGING: Myocardial perfusion imaging was performed at rest 30-35 minutes following the intravenous injection of 9.0 mCi of (Tc-tetrofosmin) followed by 10 ml of Normal

## 2023-03-27 NOTE — DISCHARGE INSTRUCTIONS
[address],[facility]      Instructions to Patients    The following are the instructions for patients who have had a procedure in our office today. Patient name: Raymundo Varela    Radionuclide Activity: 40mCi of 99mTc-Tetrofosmin (Myoview)    Date Administered: 3/27/2023    Expires: 48 hours after scheduled appointment time      Patient may resume normal activity unless otherwise instructed. Patient may resume medications as normal.  If the need should arise, patient may call [phone] between the hours of 8:00am-4:30pm.  After hours there is at least one physician on-call at all times for those patients needing assistance. Patients may call [phone] and the answering service will direct the patient to one of our physicians for assistance. After the patient's test if they are going to be leaving from an airport in the near future they should take this letter with them to verify the test and radionuclide used for their test.      This letter verifies that the above named bearer received an injection of a radionuclide for medical purpose/usage only.         Electronically signed by Radha Pratt on 3/27/2023 at 8:46 AM

## 2023-09-27 ENCOUNTER — APPOINTMENT (OUTPATIENT)
Dept: CT IMAGING | Age: 63
End: 2023-09-27
Payer: COMMERCIAL

## 2023-09-27 ENCOUNTER — HOSPITAL ENCOUNTER (EMERGENCY)
Age: 63
Discharge: HOME | End: 2023-09-27
Attending: STUDENT IN AN ORGANIZED HEALTH CARE EDUCATION/TRAINING PROGRAM
Payer: COMMERCIAL

## 2023-09-27 ENCOUNTER — APPOINTMENT (OUTPATIENT)
Dept: GENERAL RADIOLOGY | Age: 63
End: 2023-09-27
Payer: COMMERCIAL

## 2023-09-27 VITALS
SYSTOLIC BLOOD PRESSURE: 127 MMHG | TEMPERATURE: 97.9 F | DIASTOLIC BLOOD PRESSURE: 85 MMHG | BODY MASS INDEX: 35.25 KG/M2 | HEART RATE: 100 BPM | WEIGHT: 199 LBS | OXYGEN SATURATION: 99 % | RESPIRATION RATE: 18 BRPM

## 2023-09-27 DIAGNOSIS — S93.402A SPRAIN OF LEFT ANKLE, UNSPECIFIED LIGAMENT, INITIAL ENCOUNTER: Primary | ICD-10-CM

## 2023-09-27 PROCEDURE — 73610 X-RAY EXAM OF ANKLE: CPT

## 2023-09-27 PROCEDURE — 73700 CT LOWER EXTREMITY W/O DYE: CPT

## 2023-09-27 PROCEDURE — 73630 X-RAY EXAM OF FOOT: CPT

## 2023-09-27 PROCEDURE — 99284 EMERGENCY DEPT VISIT MOD MDM: CPT

## 2023-09-27 PROCEDURE — 73560 X-RAY EXAM OF KNEE 1 OR 2: CPT

## 2023-09-27 PROCEDURE — 6370000000 HC RX 637 (ALT 250 FOR IP): Performed by: STUDENT IN AN ORGANIZED HEALTH CARE EDUCATION/TRAINING PROGRAM

## 2023-09-27 RX ORDER — OXYCODONE HYDROCHLORIDE 5 MG/1
5 TABLET ORAL ONCE
Status: COMPLETED | OUTPATIENT
Start: 2023-09-27 | End: 2023-09-27

## 2023-09-27 RX ADMIN — OXYCODONE HYDROCHLORIDE 5 MG: 5 TABLET ORAL at 12:05

## 2023-09-27 ASSESSMENT — PAIN - FUNCTIONAL ASSESSMENT: PAIN_FUNCTIONAL_ASSESSMENT: 0-10

## 2023-09-27 ASSESSMENT — PAIN DESCRIPTION - DESCRIPTORS
DESCRIPTORS: ACHING;SHARP;DISCOMFORT
DESCRIPTORS: ACHING;DISCOMFORT;THROBBING

## 2023-09-27 ASSESSMENT — PATIENT HEALTH QUESTIONNAIRE - PHQ9
SUM OF ALL RESPONSES TO PHQ QUESTIONS 1-9: 0
2. FEELING DOWN, DEPRESSED OR HOPELESS: 0
SUM OF ALL RESPONSES TO PHQ9 QUESTIONS 1 & 2: 0
1. LITTLE INTEREST OR PLEASURE IN DOING THINGS: 0
SUM OF ALL RESPONSES TO PHQ QUESTIONS 1-9: 0

## 2023-09-27 ASSESSMENT — PAIN DESCRIPTION - LOCATION
LOCATION: ANKLE
LOCATION: ANKLE

## 2023-09-27 ASSESSMENT — PAIN DESCRIPTION - ORIENTATION
ORIENTATION: LEFT
ORIENTATION: LEFT

## 2023-09-27 ASSESSMENT — ENCOUNTER SYMPTOMS
NAUSEA: 0
BACK PAIN: 0
ROS SKIN COMMENTS: ABRASION RIGHT KNEE
ABDOMINAL PAIN: 0
DIARRHEA: 0
COLOR CHANGE: 0
VOMITING: 0
SHORTNESS OF BREATH: 0
COUGH: 0

## 2023-09-27 ASSESSMENT — LIFESTYLE VARIABLES: HOW OFTEN DO YOU HAVE A DRINK CONTAINING ALCOHOL: NEVER

## 2023-09-27 ASSESSMENT — PAIN SCALES - GENERAL
PAINLEVEL_OUTOF10: 10
PAINLEVEL_OUTOF10: 10

## 2023-09-27 NOTE — ED PROVIDER NOTES
HPI   72-year-old female patient presenting to emergency department for evaluation of left ankle pain status post fall. Patient had a trip and fall, inversion injury. Having tenderness to left ankle left foot. She states that she fell forward and landed on her right knee. She is able to move the knee. She has no overlying abrasion. She denies any head strike. No neck or back pain. No numbness or weakness anywhere. She is not on any blood thinners. Review of Systems   Constitutional:  Negative for chills and fever. HENT:  Negative for congestion. Respiratory:  Negative for cough and shortness of breath. Cardiovascular:  Negative for chest pain. Gastrointestinal:  Negative for abdominal pain, diarrhea, nausea and vomiting. Genitourinary:  Negative for difficulty urinating, dysuria and hematuria. Musculoskeletal:  Negative for back pain. Left ankle pain   Skin:  Negative for color change. Abrasion right knee   All other systems reviewed and are negative. Physical Exam  Vitals and nursing note reviewed. Constitutional:       Appearance: Normal appearance. HENT:      Head: Normocephalic and atraumatic. Nose: Nose normal. No congestion. Mouth/Throat:      Mouth: Mucous membranes are moist.      Pharynx: Oropharynx is clear. Eyes:      Conjunctiva/sclera: Conjunctivae normal.      Pupils: Pupils are equal, round, and reactive to light. Cardiovascular:      Rate and Rhythm: Normal rate and regular rhythm. Pulses: Normal pulses. Heart sounds: Normal heart sounds. Pulmonary:      Effort: Pulmonary effort is normal. No respiratory distress. Breath sounds: Normal breath sounds. Abdominal:      General: Bowel sounds are normal. There is no distension. Tenderness: There is no abdominal tenderness. Musculoskeletal:         General: Normal range of motion. Cervical back: Normal range of motion and neck supple.       Comments: TTP left 5th

## 2023-10-02 LAB — MAMMOGRAPHY, EXTERNAL: NORMAL

## 2023-11-30 ENCOUNTER — OFFICE VISIT (OUTPATIENT)
Dept: ORTHOPEDIC SURGERY | Age: 63
End: 2023-11-30
Payer: COMMERCIAL

## 2023-11-30 VITALS — WEIGHT: 196 LBS | HEIGHT: 63 IN | BODY MASS INDEX: 34.73 KG/M2

## 2023-11-30 DIAGNOSIS — M25.572 ACUTE LEFT ANKLE PAIN: Primary | ICD-10-CM

## 2023-11-30 DIAGNOSIS — S93.492A SPRAIN OF ANTERIOR TALOFIBULAR LIGAMENT OF LEFT ANKLE, INITIAL ENCOUNTER: ICD-10-CM

## 2023-11-30 PROCEDURE — 99203 OFFICE O/P NEW LOW 30 MIN: CPT | Performed by: STUDENT IN AN ORGANIZED HEALTH CARE EDUCATION/TRAINING PROGRAM

## 2023-11-30 RX ORDER — MULTIVIT-MIN/IRON/FOLIC ACID/K 18-600-40
CAPSULE ORAL
COMMUNITY

## 2023-11-30 RX ORDER — MELOXICAM 15 MG/1
15 TABLET ORAL DAILY
Qty: 14 TABLET | Refills: 1 | Status: SHIPPED | OUTPATIENT
Start: 2023-11-30

## 2023-11-30 RX ORDER — VITAMIN B COMPLEX
1 CAPSULE ORAL DAILY
COMMUNITY

## 2023-12-27 ENCOUNTER — TREATMENT (OUTPATIENT)
Dept: PHYSICAL THERAPY | Age: 63
End: 2023-12-27
Payer: COMMERCIAL

## 2023-12-27 DIAGNOSIS — S93.492A SPRAIN OF ANTERIOR TALOFIBULAR LIGAMENT OF LEFT ANKLE, INITIAL ENCOUNTER: Primary | ICD-10-CM

## 2023-12-27 PROCEDURE — 97035 APP MDLTY 1+ULTRASOUND EA 15: CPT | Performed by: PHYSICAL THERAPIST

## 2023-12-27 PROCEDURE — 97110 THERAPEUTIC EXERCISES: CPT | Performed by: PHYSICAL THERAPIST

## 2023-12-29 ENCOUNTER — TREATMENT (OUTPATIENT)
Dept: PHYSICAL THERAPY | Age: 63
End: 2023-12-29

## 2023-12-29 DIAGNOSIS — S93.492A SPRAIN OF ANTERIOR TALOFIBULAR LIGAMENT OF LEFT ANKLE, INITIAL ENCOUNTER: Primary | ICD-10-CM

## 2024-01-03 ENCOUNTER — TREATMENT (OUTPATIENT)
Dept: PHYSICAL THERAPY | Age: 64
End: 2024-01-03
Payer: COMMERCIAL

## 2024-01-03 DIAGNOSIS — S93.492A SPRAIN OF ANTERIOR TALOFIBULAR LIGAMENT OF LEFT ANKLE, INITIAL ENCOUNTER: Primary | ICD-10-CM

## 2024-01-03 PROCEDURE — 97110 THERAPEUTIC EXERCISES: CPT | Performed by: PHYSICAL THERAPIST

## 2024-01-03 PROCEDURE — 97035 APP MDLTY 1+ULTRASOUND EA 15: CPT | Performed by: PHYSICAL THERAPIST

## 2024-01-03 NOTE — PROGRESS NOTES
[facility]          Phone: [phone] Fax: [phone]    Physical Therapy Daily Treatment Note  Date:  1/3/2024    Patient Name:  Laura Silver    :  1960  MRN: [id number]    Evaluating therapist: Nakia Dalton, PT, DPT  [id number]    Restrictions/Precautions:      Diagnosis:     Diagnosis Orders   1. Sprain of anterior talofibular ligament of left ankle, initial encounter            Treatment Diagnosis:    Insurance/Certification information:  [facility] [employer]  Referring Physician:  Colt Mcdaniel DO  Plan of care signed (Y/N):  yes  Visit# / total visits:    Pain level: 1/10   Time In:  1120  Time Out:  1155    Subjective:  Pt reports decreased pain upon arrival this date and improved tolerance to activities such as bowling but continues to plan to wear brace while granddaughter is staying with her.    Exercises:  Exercise/Equipment Resistance/Repetitions Other comments                         Bike 10 min      BAPS board PF/DF 15x ea Level 2     BAPS board Inv/Ever 15x ea Level 2     BAPS board circles CW/CCW 10x ea Level 2          Emphasis on gastroc engagement, pushing down into floor         Yellow TB, limited d/t pain            4way ankle 15x ea,  AROM only    Standing heel raise 7x Limited d/t pain for any additional reps.                          Other Therapeutic Activities:  Pt tolerated TE's but had discomfort limiting further reps with standing B LE heel raises. Pain increased with mobilization of lateral malleolus. Tape applied and ultrasound ATFL and posterior to lateral malleolus.    Home Exercise Program:  Ankle alphabet, long sitting gastroc stretch    Manual Treatments:  applied kinesiology tape along lateral aspect of  L ankle with 50-60% stretch I-strip with 90° bend to follow path of fibularis longus muscle to aide in decompression and edema management, wrapped second strip at 50% stretch from ATFL posteriorly under lateral malleolus and

## 2024-01-04 ENCOUNTER — OFFICE VISIT (OUTPATIENT)
Dept: ORTHOPEDIC SURGERY | Age: 64
End: 2024-01-04
Payer: COMMERCIAL

## 2024-01-04 VITALS — BODY MASS INDEX: 33.66 KG/M2 | HEIGHT: 63 IN | WEIGHT: 190 LBS

## 2024-01-04 DIAGNOSIS — M25.572 ACUTE LEFT ANKLE PAIN: Primary | ICD-10-CM

## 2024-01-04 DIAGNOSIS — S93.492A SPRAIN OF ANTERIOR TALOFIBULAR LIGAMENT OF LEFT ANKLE, INITIAL ENCOUNTER: ICD-10-CM

## 2024-01-04 PROCEDURE — 99213 OFFICE O/P EST LOW 20 MIN: CPT | Performed by: STUDENT IN AN ORGANIZED HEALTH CARE EDUCATION/TRAINING PROGRAM

## 2024-01-04 NOTE — PROGRESS NOTES
[facility]  ORTHOPAEDIC SURGERY   DATE OF VISIT: 01/04/24  Follow Up Visit     CHIEF COMPLAINT:   Chief Complaint   Patient presents with    Follow-up        Laura Silver returns today for follow up visit regarding left ankle sprain. Treatment thus far has included physical therapy to include peroneal tendon strengthening as well as desensitization and proprioceptive therapy.  She reports symptoms are 50 to 60% improved.     Physical Exam:     No data recorded    General: Alert and oriented x3, no acute distress  Psych: mentation normal, non pressured speech   Cardiovascular/pulmonary: No labored breathing, peripheral perfusion intact  Musculoskeletal:    Left lower extremity  Skin intact  Compartment soft compressible  Neurovascular intact otherwise  No tenderness palpation along the peroneal tendons  There is still some slight discomfort with passive inversion however significantly reduced from her last visit  There is still slight discomfort with resisted eversion however this is again significantly reduced from her last visit  Full range of motion with plantarflexion and dorsiflexion of the foot and ankle  No tenderness palpation or bony prominences    Controlled Substances Monitoring:      Imaging:  Previous imaging reviewed of the left ankle      Assessment: Improving left ankle sprain, inversion injury      Plan:   Continue with weightbearing as tolerated  Continue with physical therapy  Patient will plan for follow-up as needed as well as if she feels she needs further physical therapy she will call back.    E/M EST Level 3- Greater than 20 minutes were spent with patient including history, exam, review of imaging (not including taking), discussion of diagnosis and treatment options, answering questions, and documentation.       Colt Mcdaniel DO   Orthopaedic Surgery   1/4/24  8:26 AM

## 2024-01-12 ENCOUNTER — TREATMENT (OUTPATIENT)
Dept: PHYSICAL THERAPY | Age: 64
End: 2024-01-12

## 2024-01-12 DIAGNOSIS — S93.492A SPRAIN OF ANTERIOR TALOFIBULAR LIGAMENT OF LEFT ANKLE, INITIAL ENCOUNTER: Primary | ICD-10-CM

## 2024-01-12 NOTE — PROGRESS NOTES
[facility]          Phone: [phone] Fax: [phone]    Physical Therapy Daily Treatment Note  Date:  2024    Patient Name:  Laura Silver    :  1960  MRN: [id number]    Evaluating therapist: Nakia Dalton, PT, DPT  [id number]    Restrictions/Precautions:      Diagnosis:     Diagnosis Orders   1. Sprain of anterior talofibular ligament of left ankle, initial encounter              Treatment Diagnosis:    Insurance/Certification information:  [facility] [employer]  Referring Physician:  Colt Mcdaniel DO  Plan of care signed (Y/N):  yes  Visit# / total visits:    Pain level: 4/10   Time In:  1035  Time Out:  1118    Subjective:  Pt reports increased ankle sorenss upon arrival which she attributes to increased squatting and movement while her granddaughter is staying with her.    Exercises:  Exercise/Equipment Resistance/Repetitions Other comments                         Bike 10 min      BAPS board PF/DF 15x ea Level 2     BAPS board Inv/Ever 15x ea Level 2     BAPS board circles CW/CCW 10x ea Level 2          Emphasis on gastroc engagement, pushing down into floor         Yellow TB, limited d/t pain            4way ankle 10x ea Yellow TB for DF and first 7 if PF, AROM for inver/ever   Limited d/t pain for any additional reps.     Step ups 10x forward, 10x lateral 6\" steps     Single leg balance  15 sec x3 reps       3 way kicks 10x ea direction, L LE WB      Other Therapeutic Activities:  Pt tolerated introduction of resistance as noted in flow chart but was limited by onset of pain in ankle after 7x PF with resistance during 4 way ankle. She was able to tolerate increased WB exercises for SLS and 3-way kicks to improve ankle stability. Pt notes persistent pinching/burning pain extending up anterolateral aspect of L ankle with muscular fatigue. Several breaks required during session to allow pain to decrease prior to attempting additional TE's.     Home Exercise

## 2024-01-17 ENCOUNTER — TREATMENT (OUTPATIENT)
Dept: PHYSICAL THERAPY | Age: 64
End: 2024-01-17
Payer: COMMERCIAL

## 2024-01-17 DIAGNOSIS — S93.492A SPRAIN OF ANTERIOR TALOFIBULAR LIGAMENT OF LEFT ANKLE, INITIAL ENCOUNTER: Primary | ICD-10-CM

## 2024-01-17 PROCEDURE — 97110 THERAPEUTIC EXERCISES: CPT

## 2024-01-17 PROCEDURE — 97035 APP MDLTY 1+ULTRASOUND EA 15: CPT

## 2024-01-17 NOTE — PROGRESS NOTES
[facility]          Phone: [phone] Fax: [phone]    Physical Therapy Daily Treatment Note  Date:  2024    Patient Name:  Laura Silver    :  1960  MRN: [id number]    Evaluating therapist: Nakia Dalton, PT, DPT  [id number]    Restrictions/Precautions:      Diagnosis:     Diagnosis Orders   1. Sprain of anterior talofibular ligament of left ankle, initial encounter              Treatment Diagnosis:    Insurance/Certification information:  [facility] [employer]  Referring Physician:  Colt Mcdaniel DO  Plan of care signed (Y/N):  yes  Visit# / total visits:    Pain level: 4/10   Time In:  748  Time Out:  828    Subjective:  Pt continues to report increased ankle sorenss upon arrival which she attributes to increased squatting and movement while her granddaughter is staying with her.    Exercises:  Exercise/Equipment Resistance/Repetitions Other comments                         Bike 10 min      BAPS board PF/DF 15x ea Level 2     BAPS board Inv/Ever 15x ea Level 2     BAPS board circles CW/CCW 10x ea Level 2          Emphasis on gastroc engagement, pushing down into floor         Yellow TB, limited d/t pain            4way ankle 10x ea  5 x AROM inv 5x YTB w/inv Yellow TB for DF/PF,   AROM for ever   Limited d/t pain for any additional reps.     Step ups 10x forward, 10x lateral 6\" steps     Single leg balance  15 sec x3 reps       3 way kicks 10x ea direction, L LE WB      Other Therapeutic Activities:  Pt tolerated resistance as noted in flow chart during 4 way ankle. She was able to tolerate increased WB exercises for SLS and 3-way kicks to improve ankle stability. Pt had no pinching/burning pain extending up anterolateral aspect of L ankle today but does report muscular fatigue.     Home Exercise Program:  Ankle alphabet, long sitting gastroc stretch    Manual Treatments:  --    Modalities:   Ultrasound 3 Mhz 100% cycle, 1.0 intensity x8 min lateral L ankle

## 2024-01-24 ENCOUNTER — TREATMENT (OUTPATIENT)
Dept: PHYSICAL THERAPY | Age: 64
End: 2024-01-24
Payer: COMMERCIAL

## 2024-01-24 DIAGNOSIS — S93.492A SPRAIN OF ANTERIOR TALOFIBULAR LIGAMENT OF LEFT ANKLE, INITIAL ENCOUNTER: Primary | ICD-10-CM

## 2024-01-24 PROCEDURE — 97035 APP MDLTY 1+ULTRASOUND EA 15: CPT

## 2024-01-24 PROCEDURE — 97110 THERAPEUTIC EXERCISES: CPT

## 2024-01-24 NOTE — PROGRESS NOTES
[facility]          Phone: [phone] Fax: [phone]    Physical Therapy Daily Treatment Note  Date:  2024    Patient Name:  Laura Silver    :  1960  MRN: [id number]    Evaluating therapist: Nakia Dalton, PT, DPT  [id number]    Restrictions/Precautions:      Diagnosis:     Diagnosis Orders   1. Sprain of anterior talofibular ligament of left ankle, initial encounter              Treatment Diagnosis:    Insurance/Certification information:  [facility] [employer]  Referring Physician:  Colt Mcdaniel DO  Plan of care signed (Y/N):  yes  Visit# / total visits:    Pain level: 0/10   Time In:  835  Time Out:  915    Subjective:  Pt had no c/o this am. Pt reported she was able to bowl without wearing her L ankle brace    Exercises:  Exercise/Equipment Resistance/Repetitions Other comments                         Bike 10 min      BAPS board PF/DF 15x ea Level 2     BAPS board Inv/Ever 15x ea Level 2     BAPS board circles CW/CCW 10x ea Level 2          Emphasis on gastroc engagement, pushing down into floor         Yellow TB, limited d/t pain            4way ankle 10x ea    RTB for DF/PF, INV  YTB for ever   Limited d/t pain for any additional reps.     Step ups 10x forward, 10x lateral 6\" steps     Single leg balance       3 way kicks 10x ea direction, L LE WB      24  L LE:   DF  5°, INV 45°, ever 10°    L LE:  DF 3+/5, PF 4/5, INV 3+/5 ( limited by pain 3/10 ) , ever 3/5    Pt can amb throughout [facility] 300 ft + with <3/10 pain    LEFS :  73/80        Other Therapeutic Activities:  Pt tolerated resistance as noted in flow chart during 4 way ankle. She was able to tolerate increased WB exercises, 3-way kicks to improve ankle stability. Pt reports muscular fatigue.     Home Exercise Program:  Ankle alphabet, long sitting gastroc stretch    Manual Treatments:  --    Modalities:   Ultrasound 3 Mhz 100% cycle, 1.0 intensity x8 min lateral L ankle posterior and

## 2024-01-24 NOTE — PROGRESS NOTES
[facility]                Phone: [phone]  Fax: [phone]    Physical Therapy  Out Patient Progress Report      Date:  2024    Patient Name:  Laura Silver    :  1960  MRN: [id number]      DIAGNOSIS:     Diagnosis Orders   1. Sprain of anterior talofibular ligament of left ankle, initial encounter          REFERRING PHYSICIAN:  Colt Mcdaniel DO  CERTIFICATION PERIOD:  2023 to 3/29/2024    ATTENDANCE:  Pt has attended 8 of 8 scheduled treatments from 2023 to 2024.  TREATMENTS RECEIVED:  Skilled PT services included therapeutic exercises for increased ROM and strength, manual therapy for joint mobilization, ultrasound, and kinesiotaping for decreased inflammation and improved joint proprioception respectively.    INITIAL STATUS:  Pain reported 2-10/10  L ROM decreased overall  Strength decreased overall  Decreased functional ability with use of left lower extremity, standing tolerance , walking, stairs, difficulty participating in bowling d/t pain of L ankle    LEFS 24/80    CURRENT STATUS:  Pain reported 0-4/10  L ankle ROM:  DF 5°, INV 45°, ever 10°   L ankle MMT:  DF 3+/5, PF 4/5, INV 3+/5 ( limited by pain 3/10 ) , ever 3/5   Improved overall functional mobility with increased tolerance to amb community distances >300' with pain <3/10 and return to bowling without pain.  LEFS 73/80      COMMENTS AND RECOMMENDATIONS:  Pt will benefit from continued therapy to improve ROM and strength to decrease risk of reinjury. Recommending 4 additional visits.    Physician     Date

## 2024-02-07 ENCOUNTER — TREATMENT (OUTPATIENT)
Dept: PHYSICAL THERAPY | Age: 64
End: 2024-02-07

## 2024-02-07 DIAGNOSIS — S93.492A SPRAIN OF ANTERIOR TALOFIBULAR LIGAMENT OF LEFT ANKLE, INITIAL ENCOUNTER: Primary | ICD-10-CM

## 2024-04-04 NOTE — PROGRESS NOTES
and follow up with ortho as scheduled    RECOMMENDATIONS:  continue with HEP        Thank you for the opportunity to work with your patient. If you have questions or comments, please feel free to contact me by phone or fax.      Electronically Signed by: Nakia Dalton PT, DPT  [id number] 4/4/2024

## 2024-07-29 ENCOUNTER — TELEPHONE (OUTPATIENT)
Age: 64
End: 2024-07-29

## 2024-07-29 DIAGNOSIS — E78.5 HYPERLIPIDEMIA, UNSPECIFIED HYPERLIPIDEMIA TYPE: ICD-10-CM

## 2024-07-29 DIAGNOSIS — Z00.00 WELL ADULT EXAM: Primary | ICD-10-CM

## 2024-07-29 DIAGNOSIS — R53.83 OTHER FATIGUE: ICD-10-CM

## 2024-07-29 DIAGNOSIS — Z00.00 WELL ADULT EXAM: ICD-10-CM

## 2024-07-29 LAB
ALBUMIN: 4.5 G/DL (ref 3.5–5.2)
ALP BLD-CCNC: 103 U/L (ref 35–104)
ALT SERPL-CCNC: 21 U/L (ref 0–32)
ANION GAP SERPL CALCULATED.3IONS-SCNC: 11 MMOL/L (ref 7–16)
AST SERPL-CCNC: 23 U/L (ref 0–31)
BASOPHILS ABSOLUTE: 0.04 K/UL (ref 0–0.2)
BASOPHILS RELATIVE PERCENT: 1 % (ref 0–2)
BILIRUB SERPL-MCNC: 0.4 MG/DL (ref 0–1.2)
BUN BLDV-MCNC: 15 MG/DL (ref 6–23)
CALCIUM SERPL-MCNC: 9.4 MG/DL (ref 8.6–10.2)
CHLORIDE BLD-SCNC: 105 MMOL/L (ref 98–107)
CHOLESTEROL, TOTAL: 228 MG/DL
CO2: 26 MMOL/L (ref 22–29)
CREAT SERPL-MCNC: 0.8 MG/DL (ref 0.5–1)
EOSINOPHILS ABSOLUTE: 0.35 K/UL (ref 0.05–0.5)
EOSINOPHILS RELATIVE PERCENT: 5 % (ref 0–6)
GFR, ESTIMATED: 80 ML/MIN/1.73M2
GLUCOSE BLD-MCNC: 103 MG/DL (ref 74–99)
HCT VFR BLD CALC: 49.9 % (ref 34–48)
HDLC SERPL-MCNC: 67 MG/DL
HEMOGLOBIN: 15.3 G/DL (ref 11.5–15.5)
IMMATURE GRANULOCYTES %: 0 % (ref 0–5)
IMMATURE GRANULOCYTES ABSOLUTE: <0.03 K/UL (ref 0–0.58)
LDL CHOLESTEROL: 136 MG/DL
LYMPHOCYTES ABSOLUTE: 2.01 K/UL (ref 1.5–4)
LYMPHOCYTES RELATIVE PERCENT: 26 % (ref 20–42)
MCH RBC QN AUTO: 24.2 PG (ref 26–35)
MCHC RBC AUTO-ENTMCNC: 30.7 G/DL (ref 32–34.5)
MCV RBC AUTO: 79 FL (ref 80–99.9)
MONOCYTES ABSOLUTE: 0.95 K/UL (ref 0.1–0.95)
MONOCYTES RELATIVE PERCENT: 12 % (ref 2–12)
NEUTROPHILS ABSOLUTE: 4.35 K/UL (ref 1.8–7.3)
NEUTROPHILS RELATIVE PERCENT: 56 % (ref 43–80)
PDW BLD-RTO: 17.2 % (ref 11.5–15)
PLATELET # BLD: 238 K/UL (ref 130–450)
PMV BLD AUTO: 11.3 FL (ref 7–12)
POTASSIUM SERPL-SCNC: 4.5 MMOL/L (ref 3.5–5)
RBC # BLD: 6.32 M/UL (ref 3.5–5.5)
SODIUM BLD-SCNC: 142 MMOL/L (ref 132–146)
TOTAL PROTEIN: 7.5 G/DL (ref 6.4–8.3)
TRIGL SERPL-MCNC: 124 MG/DL
TSH SERPL DL<=0.05 MIU/L-ACNC: 1.34 UIU/ML (ref 0.27–4.2)
VLDLC SERPL CALC-MCNC: 25 MG/DL
WBC # BLD: 7.7 K/UL (ref 4.5–11.5)

## 2024-07-31 ENCOUNTER — OFFICE VISIT (OUTPATIENT)
Age: 64
End: 2024-07-31

## 2024-07-31 VITALS
SYSTOLIC BLOOD PRESSURE: 122 MMHG | WEIGHT: 193 LBS | HEIGHT: 63 IN | RESPIRATION RATE: 18 BRPM | HEART RATE: 77 BPM | TEMPERATURE: 97.4 F | BODY MASS INDEX: 34.2 KG/M2 | OXYGEN SATURATION: 99 % | DIASTOLIC BLOOD PRESSURE: 78 MMHG

## 2024-07-31 DIAGNOSIS — R53.83 OTHER FATIGUE: ICD-10-CM

## 2024-07-31 DIAGNOSIS — Z00.00 WELL ADULT EXAM: Primary | ICD-10-CM

## 2024-07-31 DIAGNOSIS — E78.5 HYPERLIPIDEMIA, UNSPECIFIED HYPERLIPIDEMIA TYPE: ICD-10-CM

## 2024-07-31 LAB
BILIRUBIN, POC: NEGATIVE
BLOOD URINE, POC: NEGATIVE
CLARITY, POC: CLEAR
COLOR, POC: NORMAL
GLUCOSE URINE, POC: NEGATIVE
KETONES, POC: NEGATIVE
LEUKOCYTE EST, POC: NORMAL
NITRITE, POC: NEGATIVE
PH, POC: 6
PROTEIN, POC: NEGATIVE
SPECIFIC GRAVITY, POC: 1.03
UROBILINOGEN, POC: NORMAL

## 2024-07-31 SDOH — ECONOMIC STABILITY: FOOD INSECURITY: WITHIN THE PAST 12 MONTHS, THE FOOD YOU BOUGHT JUST DIDN'T LAST AND YOU DIDN'T HAVE MONEY TO GET MORE.: NEVER TRUE

## 2024-07-31 SDOH — ECONOMIC STABILITY: HOUSING INSECURITY
IN THE LAST 12 MONTHS, WAS THERE A TIME WHEN YOU DID NOT HAVE A STEADY PLACE TO SLEEP OR SLEPT IN A SHELTER (INCLUDING NOW)?: NO

## 2024-07-31 SDOH — ECONOMIC STABILITY: INCOME INSECURITY: HOW HARD IS IT FOR YOU TO PAY FOR THE VERY BASICS LIKE FOOD, HOUSING, MEDICAL CARE, AND HEATING?: NOT HARD AT ALL

## 2024-07-31 SDOH — ECONOMIC STABILITY: FOOD INSECURITY: WITHIN THE PAST 12 MONTHS, YOU WORRIED THAT YOUR FOOD WOULD RUN OUT BEFORE YOU GOT MONEY TO BUY MORE.: NEVER TRUE

## 2024-07-31 ASSESSMENT — PATIENT HEALTH QUESTIONNAIRE - PHQ9
2. FEELING DOWN, DEPRESSED OR HOPELESS: NOT AT ALL
SUM OF ALL RESPONSES TO PHQ QUESTIONS 1-9: 0
SUM OF ALL RESPONSES TO PHQ9 QUESTIONS 1 & 2: 0
SUM OF ALL RESPONSES TO PHQ QUESTIONS 1-9: 0
SUM OF ALL RESPONSES TO PHQ QUESTIONS 1-9: 0
1. LITTLE INTEREST OR PLEASURE IN DOING THINGS: NOT AT ALL
SUM OF ALL RESPONSES TO PHQ QUESTIONS 1-9: 0

## 2024-08-01 NOTE — PROGRESS NOTES
Laura Silver (:  1960) is a 63 y.o. female,Established patient, here for evaluation of the following chief complaint(s):  Annual Exam (Physical. )      Assessment & Plan   1. Well adult exam  -     POCT Urinalysis no Micro  -     CBC with Auto Differential; Future  -     Comprehensive Metabolic Panel; Future  -     Lipid Panel; Future  -     TSH; Future  2. Hyperlipidemia, unspecified hyperlipidemia type  -     Comprehensive Metabolic Panel; Future  -     Lipid Panel; Future  3. Other fatigue  -     CBC with Auto Differential; Future  -     TSH; Future      Return in about 1 year (around 2025).       Subjective   HPI  63-year-old comes in for her fasting physical.  Recent blood work was reviewed and stable total cholesterol mildly elevated at 228 with an LDL of 136 HDL is good at 67 discussed diet exercise and weight loss.  Colonoscopy was done in 2022 did show a polyp she will be due again in  mammogram in 2023 was negative she sees her gynecologist every October.  Eye exams are up-to-date.  She is feeling quite well  Review of Systems   All other systems reviewed and are negative.         Objective   Physical Exam  Vitals reviewed.   Constitutional:       General: She is not in acute distress.     Appearance: Normal appearance. She is not ill-appearing or toxic-appearing.   HENT:      Head: Normocephalic and atraumatic.      Right Ear: Tympanic membrane and ear canal normal.      Left Ear: Tympanic membrane and ear canal normal.      Nose: Nose normal.      Mouth/Throat:      Mouth: Mucous membranes are moist.      Pharynx: Oropharynx is clear.   Eyes:      General: No scleral icterus.     Extraocular Movements: Extraocular movements intact.      Conjunctiva/sclera: Conjunctivae normal.      Pupils: Pupils are equal, round, and reactive to light.   Neck:      Vascular: No carotid bruit.   Cardiovascular:      Rate and Rhythm: Normal rate and regular rhythm.      Pulses: Normal

## 2024-10-15 LAB — MAMMOGRAPHY, EXTERNAL: NORMAL

## 2024-11-04 ENCOUNTER — TELEPHONE (OUTPATIENT)
Age: 64
End: 2024-11-04

## 2024-11-04 ENCOUNTER — OFFICE VISIT (OUTPATIENT)
Age: 64
End: 2024-11-04
Payer: COMMERCIAL

## 2024-11-04 VITALS
DIASTOLIC BLOOD PRESSURE: 78 MMHG | HEIGHT: 63 IN | WEIGHT: 193 LBS | SYSTOLIC BLOOD PRESSURE: 126 MMHG | BODY MASS INDEX: 34.2 KG/M2 | TEMPERATURE: 98.5 F | HEART RATE: 91 BPM | OXYGEN SATURATION: 98 % | RESPIRATION RATE: 18 BRPM

## 2024-11-04 DIAGNOSIS — K14.0 TONGUE INFLAMMATION: ICD-10-CM

## 2024-11-04 DIAGNOSIS — B37.0 THRUSH: Primary | ICD-10-CM

## 2024-11-04 PROCEDURE — 99213 OFFICE O/P EST LOW 20 MIN: CPT | Performed by: FAMILY MEDICINE

## 2024-11-04 RX ORDER — NYSTATIN 100000 [USP'U]/ML
500000 SUSPENSION ORAL 4 TIMES DAILY
Qty: 200 ML | Refills: 0 | Status: SHIPPED | OUTPATIENT
Start: 2024-11-04 | End: 2024-11-14

## 2024-11-04 ASSESSMENT — ENCOUNTER SYMPTOMS
SORE THROAT: 0
GASTROINTESTINAL NEGATIVE: 1
COUGH: 1
RHINORRHEA: 1

## 2024-11-04 NOTE — TELEPHONE ENCOUNTER
Pt states that she has red spots on her tongue with a white ring around them. They are irritating. Pt would like an appointment.

## 2024-11-04 NOTE — PROGRESS NOTES
Laura Silver (:  1960) is a 64 y.o. female,Established patient, here for evaluation of the following chief complaint(s):  Mouth Lesions (On tongue since last week. )         Assessment & Plan  Thrush  Really not sure we will try the nystatin suspension she will call or follow-up if no better consider ENT if no better.  Possibly this could be related to her recent URI viral infection    Orders:    nystatin (MYCOSTATIN) 318879 UNIT/ML suspension; Take 5 mLs by mouth 4 times daily for 10 days Retain in mouth as long as possible    Tongue inflammation    See above           No follow-ups on file.       Subjective   Mouth Lesions   Associated symptoms include mouth sores, rhinorrhea and cough. Pertinent negatives include no fever, no sore throat and no rash.     64-year-old comes in complaining of some discoloration on her tongue.  States she is getting over a viral illness with runny nose and cough which is improving she denies any fever feeling think she took was NyQuil.  She has been on no antibiotics states she noticed these red spots on her tongue mainly around the edges.  Denies any actual pain states her taste seems to be intact.  She did not check for COVID.  States her  was also sick with the upper respiratory symptoms but no tongue symptoms.  She is on no prescription medicines.  Review of Systems   Constitutional:  Negative for fever.   HENT:  Positive for mouth sores and rhinorrhea. Negative for sore throat.         Lesion on tongue   Respiratory:  Positive for cough.    Gastrointestinal: Negative.    Skin:  Negative for rash.          Objective /78   Pulse 91   Temp 98.5 °F (36.9 °C)   Resp 18   Ht 1.6 m (5' 3\")   Wt 87.5 kg (193 lb)   SpO2 98%   BMI 34.19 kg/m²    Physical Exam  Vitals reviewed.   Constitutional:       General: She is not in acute distress.     Appearance: She is not ill-appearing or toxic-appearing.   HENT:      Nose: Nose normal.      Mouth/Throat:

## 2024-11-18 ENCOUNTER — OFFICE VISIT (OUTPATIENT)
Age: 64
End: 2024-11-18
Payer: COMMERCIAL

## 2024-11-18 ENCOUNTER — TELEPHONE (OUTPATIENT)
Age: 64
End: 2024-11-18

## 2024-11-18 VITALS
RESPIRATION RATE: 18 BRPM | HEART RATE: 67 BPM | SYSTOLIC BLOOD PRESSURE: 100 MMHG | BODY MASS INDEX: 35.08 KG/M2 | DIASTOLIC BLOOD PRESSURE: 64 MMHG | TEMPERATURE: 97.8 F | OXYGEN SATURATION: 97 % | WEIGHT: 198 LBS | HEIGHT: 63 IN

## 2024-11-18 DIAGNOSIS — B08.5 HERPANGINA: Primary | ICD-10-CM

## 2024-11-18 PROCEDURE — 99213 OFFICE O/P EST LOW 20 MIN: CPT | Performed by: FAMILY MEDICINE

## 2024-11-18 ASSESSMENT — ENCOUNTER SYMPTOMS
SORE THROAT: 1
RESPIRATORY NEGATIVE: 1

## 2024-11-18 NOTE — PROGRESS NOTES
Laura Silver (:  1960) is a 64 y.o. female,Established patient, here for evaluation of the following chief complaint(s):  Mouth Lesions (Sores on roof of mouth)         Assessment & Plan  Herpangina       Orders:    Magic Mouthwash (MIRACLE MOUTHWASH); Shake Well; For Oral Use. Lidocaine Viscous 2%; 80mL, Diphenhydramine 12.5MG/5Ml; 80mL, ALUM & MAG HYDROXIDE-SIMETH 200-200-20 MG/5ML; 80mL.  2 teaspoons swish and spit 3-4 times a day as needed      No follow-ups on file.       Subjective   Mouth Lesions   Associated symptoms include mouth sores and sore throat. Pertinent negatives include no fever.     Patient comes in with sores on the roof of her mouth.  States it hurts when she eats or drinks anything.  Denies fever or chills.  Denies any sick contacts.  Review of Systems   Constitutional:  Positive for appetite change. Negative for activity change and fever.   HENT:  Positive for mouth sores and sore throat.    Respiratory: Negative.            Objective /64   Pulse 67   Temp 97.8 °F (36.6 °C)   Resp 18   Ht 1.6 m (5' 3\")   Wt 89.8 kg (198 lb)   SpO2 97%   BMI 35.07 kg/m²    Physical Exam  Vitals reviewed.   Constitutional:       General: She is not in acute distress.     Appearance: Normal appearance. She is normal weight. She is not ill-appearing or toxic-appearing.   HENT:      Mouth/Throat:      Mouth: Mucous membranes are moist.      Pharynx: Posterior oropharyngeal erythema present.        Comments: Herpangina  Neck:      Vascular: No carotid bruit.   Musculoskeletal:      Cervical back: Normal range of motion and neck supple. No tenderness.   Lymphadenopathy:      Cervical: No cervical adenopathy.   Neurological:      Mental Status: She is alert.                  An electronic signature was used to authenticate this note.    --Jorge Luis Vdial MD     Note: This report was transcribed using Dragon voice recognition software.  Every effort was made to ensure accuracy, however

## 2024-11-18 NOTE — TELEPHONE ENCOUNTER
Pt called. Finished the meds for the sores in her mouth. She said there are more on the roof of her mouth and uncomfortable to eat

## 2024-11-22 ENCOUNTER — TELEPHONE (OUTPATIENT)
Age: 64
End: 2024-11-22

## 2024-11-22 NOTE — TELEPHONE ENCOUNTER
PT calls and states that she still has sores in her mouth but the anti-biotic seems to be working. Told PT to take the weekend and call Monday to follow up.

## 2024-11-25 ENCOUNTER — TELEPHONE (OUTPATIENT)
Age: 64
End: 2024-11-25

## 2024-11-25 DIAGNOSIS — K14.8 TONGUE LESION: Primary | ICD-10-CM

## 2024-11-25 NOTE — TELEPHONE ENCOUNTER
PT calls and states that she still has sores on tongue and on the roof of the mouth. PT can swallow, but still has sores.

## 2024-11-25 NOTE — TELEPHONE ENCOUNTER
Called PT to follow up about medication. Advised PT on doctors orders to keep taking medication for lesions in mouth and follow up with [facility] doctor.

## 2024-11-26 ENCOUNTER — TELEPHONE (OUTPATIENT)
Age: 64
End: 2024-11-26

## 2024-11-26 NOTE — TELEPHONE ENCOUNTER
PT calls and states that her white lesions in her mouth have been going away and was wondering if she can cancel her ENT appointment, expressed to PT it is at her own discretion.

## 2024-12-03 ENCOUNTER — OFFICE VISIT (OUTPATIENT)
Dept: ENT CLINIC | Age: 64
End: 2024-12-03
Payer: COMMERCIAL

## 2024-12-03 VITALS
RESPIRATION RATE: 12 BRPM | WEIGHT: 195 LBS | HEIGHT: 63 IN | HEART RATE: 76 BPM | OXYGEN SATURATION: 98 % | BODY MASS INDEX: 34.55 KG/M2 | SYSTOLIC BLOOD PRESSURE: 120 MMHG | DIASTOLIC BLOOD PRESSURE: 76 MMHG | TEMPERATURE: 97.4 F

## 2024-12-03 DIAGNOSIS — R13.10 ODYNOPHAGIA: ICD-10-CM

## 2024-12-03 DIAGNOSIS — B08.5 HERPANGINA: ICD-10-CM

## 2024-12-03 DIAGNOSIS — R13.11 ORAL PHASE DYSPHAGIA: ICD-10-CM

## 2024-12-03 DIAGNOSIS — R49.9 CHANGE IN VOICE: ICD-10-CM

## 2024-12-03 DIAGNOSIS — K21.9 LPRD (LARYNGOPHARYNGEAL REFLUX DISEASE): Primary | ICD-10-CM

## 2024-12-03 DIAGNOSIS — K13.79 MOUTH PAIN: ICD-10-CM

## 2024-12-03 DIAGNOSIS — R49.0 DYSPHONIA: ICD-10-CM

## 2024-12-03 PROCEDURE — 99204 OFFICE O/P NEW MOD 45 MIN: CPT | Performed by: OTOLARYNGOLOGY

## 2024-12-03 PROCEDURE — 31575 DIAGNOSTIC LARYNGOSCOPY: CPT | Performed by: OTOLARYNGOLOGY

## 2024-12-03 RX ORDER — OMEPRAZOLE 40 MG/1
40 CAPSULE, DELAYED RELEASE ORAL
Qty: 90 CAPSULE | Refills: 1 | Status: SHIPPED | OUTPATIENT
Start: 2024-12-03

## 2024-12-03 NOTE — PROGRESS NOTES
12/3/24:Pt here for Patient states that she has red and white spots on her tongue,  patient states that she was using the magic mouth wash which it was helping, spots were on the roof of her mouth and those are gone.. no pain. No difficulty swallowing. No changes voice. Drinks 36 oz of water, 1 cup coffee daily caffeine, and no alcohol. Weight 195 lbs.

## 2024-12-03 NOTE — PROGRESS NOTES
Dear Jorge Luis Bansal MD Rich, David, MD     We had the pleasure of seeing Laura Silver, 1960 here    on 12/3/2024  Please see below for review of care and plans.     Chief complaint-     ICD-10-CM    1. LPRD (laryngopharyngeal reflux disease)  K21.9       2. Herpangina  B08.5             History of Present Illness-     12/3/24: Pt here for Patient states that she has red and white spots on her tongue,  patient states that she was using the magic mouth wash which it was helping, spots were on the roof of her mouth and those are gone. No pain. Does note night time awakening with sour taste in mouth. Notes intermittent episodes of difficulty swallowing. No changes voice. Drinks 36 oz of water, 1 cup coffee daily caffeine, and no alcohol. Weight 195 lbs     Review of Systems- No drainage, discharge, or headache.  Complete 10 system ROS completed and negative except as noted above.     Physical Examination-   Vital Signs-/76   Pulse 76   Temp 97.4 °F (36.3 °C)   Resp 12   Ht 1.6 m (5' 3\")   Wt 88.5 kg (195 lb)   SpO2 98%   BMI 34.54 kg/m²     Ears- Tympanic membranes clear bilaterally.  No middle ear effusion.  No pre or post auricular tenderness.  Nose- Nasal mucosa clear and dry.  No significant septal deviation or inferior turbinate hypertrophy.  Oral Cavity/Oropharynx- Floor of mouth and tongue are soft and nontender, with +ttp to some areas mucosa..  signs of posterior pharyngeal erythema. Fissured oral tongue. + gag reflex. No active vesicles   Neck- Soft and nontender.  No masses, lesions, lymphadenopathy, or thyroid nodules appreciated.   Cranial Nerve- Cranial nerves II to XII intact.  Extraocular muscles intact.  No gross motor visual deficits.  No spontaneous nystagmus.    Face- No facial skin tenderness to palpation.  Heart- No cyanosis, regular  Lungs- No stridor, no intercostal accessory muscle use  General- The patient is in no acute distress. A&O x3    Scope 12/3/24  Procedure

## 2024-12-10 ENCOUNTER — TELEPHONE (OUTPATIENT)
Dept: ENT CLINIC | Age: 64
End: 2024-12-10

## 2024-12-10 NOTE — TELEPHONE ENCOUNTER
Patient left a voicemail stating that the magic mouth wash prescribed has not been helping, asking for recommendations/alternative therapy. Please advise.

## 2024-12-11 ENCOUNTER — TELEPHONE (OUTPATIENT)
Dept: ENT CLINIC | Age: 64
End: 2024-12-11

## 2024-12-11 NOTE — TELEPHONE ENCOUNTER
Patient was prescribed magic mouth not working needs compound.    Last Visit : 12/03/24    No future f/u     Preferred pharmacy: [facility] [address] [place] [address]

## 2024-12-12 DIAGNOSIS — B08.5 HERPANGINA: ICD-10-CM

## 2024-12-12 NOTE — TELEPHONE ENCOUNTER
Patient called stating that she would like the compound due to her magic mouthwash not working well for her. Gave patient [facility] and told her to ask to speak with Dr. Vaughn's MA.

## 2025-07-31 ENCOUNTER — TELEPHONE (OUTPATIENT)
Age: 65
End: 2025-07-31

## 2025-07-31 NOTE — TELEPHONE ENCOUNTER
PT calls and states that she fell a month ago and is still sore in her gluteus region. Stated to her the office is closed on Thursdays and doctor is out till Monday, offered appointment on Monday and [facility] and or [facility]. PT stated she would consider her options.

## 2025-08-01 ENCOUNTER — TELEPHONE (OUTPATIENT)
Age: 65
End: 2025-08-01

## 2025-08-01 NOTE — TELEPHONE ENCOUNTER
PT calls and states that she believes she broke her tailbone, referred her to [facility] walk-in clinic or [facility] emergency, PT declined to go.

## 2025-08-03 ENCOUNTER — APPOINTMENT (OUTPATIENT)
Dept: GENERAL RADIOLOGY | Age: 65
End: 2025-08-03
Payer: COMMERCIAL

## 2025-08-03 ENCOUNTER — HOSPITAL ENCOUNTER (EMERGENCY)
Age: 65
Discharge: HOME | End: 2025-08-03
Payer: COMMERCIAL

## 2025-08-03 VITALS
SYSTOLIC BLOOD PRESSURE: 143 MMHG | BODY MASS INDEX: 34.55 KG/M2 | DIASTOLIC BLOOD PRESSURE: 80 MMHG | RESPIRATION RATE: 18 BRPM | TEMPERATURE: 98.1 F | OXYGEN SATURATION: 100 % | WEIGHT: 195 LBS | HEIGHT: 63 IN | HEART RATE: 74 BPM
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
## 2025-08-03 DIAGNOSIS — S30.0XXA CONTUSION OF COCCYX, INITIAL ENCOUNTER: Primary | ICD-10-CM

## 2025-08-03 PROCEDURE — 6370000000 HC RX 637 (ALT 250 FOR IP): Performed by: NURSE PRACTITIONER

## 2025-08-03 PROCEDURE — 72100 X-RAY EXAM L-S SPINE 2/3 VWS: CPT

## 2025-08-03 PROCEDURE — 72220 X-RAY EXAM SACRUM TAILBONE: CPT

## 2025-08-03 PROCEDURE — 99283 EMERGENCY DEPT VISIT LOW MDM: CPT

## 2025-08-03 RX ORDER — LIDOCAINE 4 G/G
1 PATCH TOPICAL DAILY
Status: DISCONTINUED | OUTPATIENT
Start: 2025-08-03 | End: 2025-08-03 | Stop reason: HOSPADM

## 2025-08-03 ASSESSMENT — PAIN DESCRIPTION - LOCATION: LOCATION: SACRUM

## 2025-08-03 ASSESSMENT — LIFESTYLE VARIABLES
HOW OFTEN DO YOU HAVE A DRINK CONTAINING ALCOHOL: NEVER
HOW MANY STANDARD DRINKS CONTAINING ALCOHOL DO YOU HAVE ON A TYPICAL DAY: PATIENT DOES NOT DRINK

## 2025-08-03 ASSESSMENT — PAIN DESCRIPTION - DESCRIPTORS: DESCRIPTORS: ACHING;DISCOMFORT;SORE

## 2025-08-03 ASSESSMENT — PAIN DESCRIPTION - ORIENTATION: ORIENTATION: MID

## 2025-08-03 ASSESSMENT — PAIN SCALES - GENERAL: PAINLEVEL_OUTOF10: 4

## 2025-08-04 DIAGNOSIS — Z00.00 WELL ADULT EXAM: ICD-10-CM

## 2025-08-04 DIAGNOSIS — R53.83 OTHER FATIGUE: ICD-10-CM

## 2025-08-04 DIAGNOSIS — E78.5 HYPERLIPIDEMIA, UNSPECIFIED HYPERLIPIDEMIA TYPE: ICD-10-CM

## 2025-08-04 LAB
ALBUMIN: 4 G/DL (ref 3.5–5.2)
ALP BLD-CCNC: 95 U/L (ref 35–104)
ALT SERPL-CCNC: 21 U/L (ref 0–35)
ANION GAP SERPL CALCULATED.3IONS-SCNC: 10 MMOL/L (ref 7–16)
AST SERPL-CCNC: 20 U/L (ref 0–35)
BASOPHILS ABSOLUTE: 0.05 K/UL (ref 0–0.2)
BASOPHILS RELATIVE PERCENT: 1 % (ref 0–2)
BILIRUB SERPL-MCNC: 0.4 MG/DL (ref 0–1.2)
BUN BLDV-MCNC: 14 MG/DL (ref 8–23)
CALCIUM SERPL-MCNC: 9.2 MG/DL (ref 8.8–10.2)
CHLORIDE BLD-SCNC: 107 MMOL/L (ref 98–107)
CHOLESTEROL, TOTAL: 184 MG/DL
CO2: 25 MMOL/L (ref 22–29)
CREAT SERPL-MCNC: 0.8 MG/DL (ref 0.5–1)
EOSINOPHILS ABSOLUTE: 0.21 K/UL (ref 0.05–0.5)
EOSINOPHILS RELATIVE PERCENT: 3 % (ref 0–6)
GFR, ESTIMATED: 83 ML/MIN/1.73M2
GLUCOSE BLD-MCNC: 97 MG/DL (ref 74–99)
HCT VFR BLD CALC: 47.3 % (ref 34–48)
HDLC SERPL-MCNC: 55 MG/DL
HEMOGLOBIN: 14.8 G/DL (ref 11.5–15.5)
IMMATURE GRANULOCYTES %: 0 % (ref 0–5)
IMMATURE GRANULOCYTES ABSOLUTE: 0.03 K/UL (ref 0–0.58)
LDL CHOLESTEROL: 104 MG/DL
LYMPHOCYTES ABSOLUTE: 2.63 K/UL (ref 1.5–4)
LYMPHOCYTES RELATIVE PERCENT: 32 % (ref 20–42)
MCH RBC QN AUTO: 24.2 PG (ref 26–35)
MCHC RBC AUTO-ENTMCNC: 31.3 G/DL (ref 32–34.5)
MCV RBC AUTO: 77.4 FL (ref 80–99.9)
MONOCYTES ABSOLUTE: 0.74 K/UL (ref 0.1–0.95)
MONOCYTES RELATIVE PERCENT: 9 % (ref 2–12)
NEUTROPHILS ABSOLUTE: 4.62 K/UL (ref 1.8–7.3)
NEUTROPHILS RELATIVE PERCENT: 56 % (ref 43–80)
PDW BLD-RTO: 16.3 % (ref 11.5–15)
PLATELET # BLD: 236 K/UL (ref 130–450)
PMV BLD AUTO: 10.8 FL (ref 7–12)
POTASSIUM SERPL-SCNC: 4 MMOL/L (ref 3.5–5.1)
RBC # BLD: 6.11 M/UL (ref 3.5–5.5)
SODIUM BLD-SCNC: 142 MMOL/L (ref 136–145)
TOTAL PROTEIN: 6.4 G/DL (ref 6.4–8.3)
TRIGL SERPL-MCNC: 123 MG/DL
TSH SERPL DL<=0.05 MIU/L-ACNC: 2.65 UIU/ML (ref 0.27–4.2)
VLDLC SERPL CALC-MCNC: 25 MG/DL
WBC # BLD: 8.3 K/UL (ref 4.5–11.5)

## 2025-08-06 ENCOUNTER — OFFICE VISIT (OUTPATIENT)
Age: 65
End: 2025-08-06
Payer: COMMERCIAL

## 2025-08-06 VITALS
DIASTOLIC BLOOD PRESSURE: 78 MMHG | BODY MASS INDEX: 34.84 KG/M2 | HEART RATE: 96 BPM | SYSTOLIC BLOOD PRESSURE: 140 MMHG | OXYGEN SATURATION: 97 % | WEIGHT: 196.6 LBS | TEMPERATURE: 98.8 F | HEIGHT: 63 IN | RESPIRATION RATE: 18 BRPM

## 2025-08-06 DIAGNOSIS — E66.811 OBESITY (BMI 30.0-34.9): ICD-10-CM

## 2025-08-06 DIAGNOSIS — Z00.00 WELL ADULT EXAM: Primary | ICD-10-CM

## 2025-08-06 DIAGNOSIS — M54.50 ACUTE RIGHT-SIDED LOW BACK PAIN WITHOUT SCIATICA: ICD-10-CM

## 2025-08-06 LAB
BILIRUBIN, POC: NORMAL
BLOOD URINE, POC: NORMAL
CLARITY, POC: CLEAR
COLOR, POC: NORMAL
GLUCOSE URINE, POC: NORMAL MG/DL
KETONES, POC: NORMAL MG/DL
LEUKOCYTE EST, POC: NORMAL
NITRITE, POC: NORMAL
PH, POC: 6
PROTEIN, POC: NORMAL MG/DL
SPECIFIC GRAVITY, POC: 1.02
UROBILINOGEN, POC: 0.2 MG/DL

## 2025-08-06 PROCEDURE — 99396 PREV VISIT EST AGE 40-64: CPT | Performed by: FAMILY MEDICINE

## 2025-08-06 PROCEDURE — 81002 URINALYSIS NONAUTO W/O SCOPE: CPT | Performed by: FAMILY MEDICINE

## 2025-08-06 RX ORDER — METHYLPREDNISOLONE 4 MG/1
TABLET ORAL
Qty: 1 KIT | Refills: 0 | Status: SHIPPED | OUTPATIENT
Start: 2025-08-06

## 2025-08-06 RX ORDER — CYCLOBENZAPRINE HCL 5 MG
5 TABLET ORAL 3 TIMES DAILY PRN
Qty: 30 TABLET | Refills: 0 | Status: SHIPPED | OUTPATIENT
Start: 2025-08-06 | End: 2025-08-16

## 2025-08-06 SDOH — ECONOMIC STABILITY: FOOD INSECURITY: WITHIN THE PAST 12 MONTHS, THE FOOD YOU BOUGHT JUST DIDN'T LAST AND YOU DIDN'T HAVE MONEY TO GET MORE.: NEVER TRUE

## 2025-08-06 SDOH — ECONOMIC STABILITY: FOOD INSECURITY: WITHIN THE PAST 12 MONTHS, YOU WORRIED THAT YOUR FOOD WOULD RUN OUT BEFORE YOU GOT MONEY TO BUY MORE.: NEVER TRUE

## 2025-08-06 ASSESSMENT — PATIENT HEALTH QUESTIONNAIRE - PHQ9
1. LITTLE INTEREST OR PLEASURE IN DOING THINGS: NOT AT ALL
SUM OF ALL RESPONSES TO PHQ QUESTIONS 1-9: 0
SUM OF ALL RESPONSES TO PHQ QUESTIONS 1-9: 0
2. FEELING DOWN, DEPRESSED OR HOPELESS: NOT AT ALL
SUM OF ALL RESPONSES TO PHQ QUESTIONS 1-9: 0
SUM OF ALL RESPONSES TO PHQ QUESTIONS 1-9: 0

## 2025-08-07 PROBLEM — E66.811 OBESITY (BMI 30.0-34.9): Status: ACTIVE | Noted: 2025-08-07

## 2025-08-07 PROBLEM — M54.50 ACUTE RIGHT-SIDED LOW BACK PAIN WITHOUT SCIATICA: Status: ACTIVE | Noted: 2025-08-07

## 2025-08-07 PROBLEM — Z00.00 WELL ADULT EXAM: Status: ACTIVE | Noted: 2025-08-07

## 2025-08-07 ASSESSMENT — ENCOUNTER SYMPTOMS
BACK PAIN: 1
RESPIRATORY NEGATIVE: 1
GASTROINTESTINAL NEGATIVE: 1

## 2025-08-13 ENCOUNTER — TELEPHONE (OUTPATIENT)
Age: 65
End: 2025-08-13